# Patient Record
Sex: FEMALE | Race: WHITE | NOT HISPANIC OR LATINO | URBAN - METROPOLITAN AREA
[De-identification: names, ages, dates, MRNs, and addresses within clinical notes are randomized per-mention and may not be internally consistent; named-entity substitution may affect disease eponyms.]

---

## 2018-03-10 ENCOUNTER — INPATIENT (INPATIENT)
Facility: HOSPITAL | Age: 28
LOS: 4 days | Discharge: HOME | DRG: 897 | End: 2018-03-15
Attending: INTERNAL MEDICINE | Admitting: INTERNAL MEDICINE
Payer: MEDICAID

## 2018-03-10 VITALS
DIASTOLIC BLOOD PRESSURE: 75 MMHG | WEIGHT: 100.09 LBS | OXYGEN SATURATION: 100 % | TEMPERATURE: 98 F | HEIGHT: 64 IN | SYSTOLIC BLOOD PRESSURE: 111 MMHG | RESPIRATION RATE: 17 BRPM | HEART RATE: 54 BPM

## 2018-03-10 DIAGNOSIS — F10.239 ALCOHOL DEPENDENCE WITH WITHDRAWAL, UNSPECIFIED: ICD-10-CM

## 2018-03-10 DIAGNOSIS — F50.9 EATING DISORDER, UNSPECIFIED: ICD-10-CM

## 2018-03-10 DIAGNOSIS — F32.9 MAJOR DEPRESSIVE DISORDER, SINGLE EPISODE, UNSPECIFIED: ICD-10-CM

## 2018-03-10 DIAGNOSIS — Z29.9 ENCOUNTER FOR PROPHYLACTIC MEASURES, UNSPECIFIED: ICD-10-CM

## 2018-03-10 DIAGNOSIS — F10.230 ALCOHOL DEPENDENCE WITH WITHDRAWAL, UNCOMPLICATED: ICD-10-CM

## 2018-03-10 LAB
ALBUMIN SERPL ELPH-MCNC: 3 G/DL — LOW (ref 3.5–5)
ALP SERPL-CCNC: 157 U/L — HIGH (ref 40–120)
ALT FLD-CCNC: 46 U/L DA — SIGNIFICANT CHANGE UP (ref 10–60)
ANION GAP SERPL CALC-SCNC: 9 MMOL/L — SIGNIFICANT CHANGE UP (ref 5–17)
AST SERPL-CCNC: 125 U/L — HIGH (ref 10–40)
BASOPHILS # BLD AUTO: 0 K/UL — SIGNIFICANT CHANGE UP (ref 0–0.2)
BASOPHILS NFR BLD AUTO: 0.6 % — SIGNIFICANT CHANGE UP (ref 0–2)
BILIRUB DIRECT SERPL-MCNC: 0.3 MG/DL — HIGH (ref 0–0.2)
BILIRUB INDIRECT FLD-MCNC: 0.5 MG/DL — SIGNIFICANT CHANGE UP (ref 0.2–1)
BILIRUB SERPL-MCNC: 0.8 MG/DL — SIGNIFICANT CHANGE UP (ref 0.2–1.2)
BUN SERPL-MCNC: 5 MG/DL — LOW (ref 7–18)
CALCIUM SERPL-MCNC: 8.7 MG/DL — SIGNIFICANT CHANGE UP (ref 8.4–10.5)
CHLORIDE SERPL-SCNC: 102 MMOL/L — SIGNIFICANT CHANGE UP (ref 96–108)
CO2 SERPL-SCNC: 26 MMOL/L — SIGNIFICANT CHANGE UP (ref 22–31)
CREAT SERPL-MCNC: 0.67 MG/DL — SIGNIFICANT CHANGE UP (ref 0.5–1.3)
EOSINOPHIL # BLD AUTO: 0 K/UL — SIGNIFICANT CHANGE UP (ref 0–0.5)
EOSINOPHIL NFR BLD AUTO: 0.3 % — SIGNIFICANT CHANGE UP (ref 0–6)
ETHANOL SERPL-MCNC: <3 MG/DL — SIGNIFICANT CHANGE UP (ref 0–10)
GLUCOSE SERPL-MCNC: 83 MG/DL — SIGNIFICANT CHANGE UP (ref 70–99)
HCG UR QL: NEGATIVE — SIGNIFICANT CHANGE UP
HCT VFR BLD CALC: 36.4 % — SIGNIFICANT CHANGE UP (ref 34.5–45)
HGB BLD-MCNC: 12.4 G/DL — SIGNIFICANT CHANGE UP (ref 11.5–15.5)
LYMPHOCYTES # BLD AUTO: 0.5 K/UL — LOW (ref 1–3.3)
LYMPHOCYTES # BLD AUTO: 8.5 % — LOW (ref 13–44)
MAGNESIUM SERPL-MCNC: 1.9 MG/DL — SIGNIFICANT CHANGE UP (ref 1.6–2.6)
MCHC RBC-ENTMCNC: 34.1 GM/DL — SIGNIFICANT CHANGE UP (ref 32–36)
MCHC RBC-ENTMCNC: 36.5 PG — HIGH (ref 27–34)
MCV RBC AUTO: 107 FL — HIGH (ref 80–100)
MONOCYTES # BLD AUTO: 0.3 K/UL — SIGNIFICANT CHANGE UP (ref 0–0.9)
MONOCYTES NFR BLD AUTO: 4.7 % — SIGNIFICANT CHANGE UP (ref 2–14)
NEUTROPHILS # BLD AUTO: 4.8 K/UL — SIGNIFICANT CHANGE UP (ref 1.8–7.4)
NEUTROPHILS NFR BLD AUTO: 86 % — HIGH (ref 43–77)
PHOSPHATE SERPL-MCNC: 4.2 MG/DL — SIGNIFICANT CHANGE UP (ref 2.5–4.5)
PLATELET # BLD AUTO: 84 K/UL — LOW (ref 150–400)
POTASSIUM SERPL-MCNC: 3.9 MMOL/L — SIGNIFICANT CHANGE UP (ref 3.5–5.3)
POTASSIUM SERPL-SCNC: 3.9 MMOL/L — SIGNIFICANT CHANGE UP (ref 3.5–5.3)
PROT SERPL-MCNC: 6.6 G/DL — SIGNIFICANT CHANGE UP (ref 6–8.3)
RBC # BLD: 3.4 M/UL — LOW (ref 3.8–5.2)
RBC # FLD: 11.8 % — SIGNIFICANT CHANGE UP (ref 10.3–14.5)
SODIUM SERPL-SCNC: 137 MMOL/L — SIGNIFICANT CHANGE UP (ref 135–145)
WBC # BLD: 5.6 K/UL — SIGNIFICANT CHANGE UP (ref 3.8–10.5)
WBC # FLD AUTO: 5.6 K/UL — SIGNIFICANT CHANGE UP (ref 3.8–10.5)

## 2018-03-10 PROCEDURE — 99285 EMERGENCY DEPT VISIT HI MDM: CPT

## 2018-03-10 PROCEDURE — 70450 CT HEAD/BRAIN W/O DYE: CPT | Mod: 26

## 2018-03-10 PROCEDURE — 93010 ELECTROCARDIOGRAM REPORT: CPT

## 2018-03-10 RX ORDER — FLUOXETINE HCL 10 MG
1 CAPSULE ORAL
Qty: 0 | Refills: 0 | COMMUNITY

## 2018-03-10 RX ORDER — SODIUM CHLORIDE 9 MG/ML
1000 INJECTION, SOLUTION INTRAVENOUS
Qty: 0 | Refills: 0 | Status: COMPLETED | OUTPATIENT
Start: 2018-03-10 | End: 2018-03-10

## 2018-03-10 RX ORDER — THIAMINE MONONITRATE (VIT B1) 100 MG
100 TABLET ORAL DAILY
Qty: 0 | Refills: 0 | Status: COMPLETED | OUTPATIENT
Start: 2018-03-10 | End: 2018-03-13

## 2018-03-10 RX ORDER — FLUOXETINE HCL 10 MG
40 CAPSULE ORAL DAILY
Qty: 0 | Refills: 0 | Status: DISCONTINUED | OUTPATIENT
Start: 2018-03-11 | End: 2018-03-15

## 2018-03-10 RX ORDER — SODIUM CHLORIDE 9 MG/ML
1000 INJECTION INTRAMUSCULAR; INTRAVENOUS; SUBCUTANEOUS
Qty: 0 | Refills: 0 | Status: DISCONTINUED | OUTPATIENT
Start: 2018-03-10 | End: 2018-03-13

## 2018-03-10 RX ORDER — SODIUM CHLORIDE 9 MG/ML
1000 INJECTION, SOLUTION INTRAVENOUS
Qty: 0 | Refills: 0 | Status: DISCONTINUED | OUTPATIENT
Start: 2018-03-11 | End: 2018-03-15

## 2018-03-10 RX ADMIN — Medication 2 MILLIGRAM(S): at 18:48

## 2018-03-10 RX ADMIN — SODIUM CHLORIDE 100 MILLILITER(S): 9 INJECTION INTRAMUSCULAR; INTRAVENOUS; SUBCUTANEOUS at 22:47

## 2018-03-10 RX ADMIN — Medication 1 MILLIGRAM(S): at 11:22

## 2018-03-10 RX ADMIN — Medication 2 MILLIGRAM(S): at 23:00

## 2018-03-10 RX ADMIN — SODIUM CHLORIDE 150 MILLILITER(S): 9 INJECTION, SOLUTION INTRAVENOUS at 12:48

## 2018-03-10 NOTE — ED PROVIDER NOTE - CARE PLAN
Principal Discharge DX:	Alcohol withdrawal syndrome with complication  Secondary Diagnosis:	Seizure  Secondary Diagnosis:	Eating disorder

## 2018-03-10 NOTE — H&P ADULT - NSHPSOCIALHISTORY_GEN_ALL_CORE
Hevayt alcohol user, usually drinks half a bottle of Vodka everyday, last drink 2 days back on Thursday when she drank one full bottle of vodka.  Never smoker and denies using illicit drugs.

## 2018-03-10 NOTE — H&P ADULT - PROBLEM SELECTOR PLAN 2
-supportive care and c/w home meds. Patient is on Prozac 40 mg and one other medication which she takes 3 times a day but does not remember the name, primary team requested to confirm with pharmacy.

## 2018-03-10 NOTE — H&P ADULT - HISTORY OF PRESENT ILLNESS
27 years old female with PMH of ETOH abuse (completed Detox program, follows with councellor), depression and eating disorder (combination of anorexia nervosa and bulimia) brought to ED c/o seizure x today. Patient was at airport today to fly to [place] after recently completing the Detox program, when she had an episode of witnessed seizure at the airport. Per mother, patient was noticed to have generalized shaking of her whole body with eye rolling and foaming at mouth. Denied any urinary or fecal incontinence, head injury or LOC. Mother said she held her and laid her on ground without letting her injure herself. No prior history of seizure. Currently denies any headache, dizziness, chest pain, palpitations, sob, abdominal pain, nausea, vomiting, diarrhea or urinary complains.     Social history: Currently unemployed, completed college. Denies smoking or illicit drug use. Usually drinks half a bottle of Vodka everyday , but had full bottle on Thursday which was last drink. LMP 1 year back, periods has been irregular for few years due to eating disorder and depression, previously were regular. Not sexually active currently.

## 2018-03-10 NOTE — H&P ADULT - ATTENDING COMMENTS
Patient seen and examined. Patient's history, vitals, labs, imaging studies reviewed. Discussed with above resident, agree with note with edits. Plan of care discussed with patient, and her mother at bedside, and agrees, all questions answered.   Kimber Zapata MD

## 2018-03-10 NOTE — PATIENT PROFILE ADULT. - VISION (WITH CORRECTIVE LENSES IF THE PATIENT USUALLY WEARS THEM):
Normal vision: sees adequately in most situations; can see medication labels, newsprint/pt wearing reading glasses

## 2018-03-10 NOTE — H&P ADULT - PROBLEM SELECTOR PLAN 1
-No prior history of seizure  -Heavy etoh use.  -Blood alcohol level is < 3  -Mild tremors noted on exam  -CT head negative for any acute events.   -Started on CIWA protocol, banana bag with multivitamins, thiamine and folic acid  -IM thiamine x 3 days  -Ativan q4 standing and q2 prn for CIWA > 7 / breakthrough seizure   -Monitor and replace electrolytes routinely.

## 2018-03-10 NOTE — ED PROVIDER NOTE - OBJECTIVE STATEMENT
26 y/o F pt w/ PMHx of bulimia presents to the ED c/o seizures. Pt has never had a seizure before. Pt states that she completed a detox program and was on her way to [place] when she had a seizure. Denies fever, chills, incontinence or any other complaints. NKDA.

## 2018-03-10 NOTE — H&P ADULT - ASSESSMENT
27 years old female with PMH of ETOH abuse (completed Detox program), depression and eating disorder (combination of anorexia nervosa and bulimia) brought to ED c/o seizure x today. Admitted to medicine for further management.

## 2018-03-10 NOTE — H&P ADULT - PMH
Depression    Eating disorder  combination of anorexia and bulimia nervosa  ETOH abuse    Pancreatitis

## 2018-03-10 NOTE — ED ADULT NURSE NOTE - CHPI ED SYMPTOMS NEG
no nausea/no fever/no blurred vision/no weakness/no vomiting/no confusion/no dizziness/no change in level of consciousness/no numbness

## 2018-03-10 NOTE — ED ADULT NURSE NOTE - OBJECTIVE STATEMENT
Patient presents to ED with seizure, patient was at airport on her way home with mother to [place]. At present no seizure activity patient c/o headache on pain scale 4/10.

## 2018-03-10 NOTE — H&P ADULT - PROBLEM SELECTOR PLAN 4
IMPROVE VTE Individual Risk Assessment          RISK                                                          Points  [  ] Previous VTE                                                3  [  ] Thrombophilia                                             2  [  ] Lower limb paralysis                                   2        (unable to hold up >15 seconds)    [  ] Current Cancer                                             2         (within 6 months)  [ x ] Immobilization > 24 hrs                              1  [  ] ICU/CCU stay > 24 hours                             1  [  ] Age > 60                                                         1    IMPROVE VTE Score: 1  No indication for DVT ppx

## 2018-03-11 LAB
24R-OH-CALCIDIOL SERPL-MCNC: 8.5 NG/ML — LOW (ref 30–80)
ANION GAP SERPL CALC-SCNC: 8 MMOL/L — SIGNIFICANT CHANGE UP (ref 5–17)
BUN SERPL-MCNC: 5 MG/DL — LOW (ref 7–18)
CALCIUM SERPL-MCNC: 8 MG/DL — LOW (ref 8.4–10.5)
CHLORIDE SERPL-SCNC: 107 MMOL/L — SIGNIFICANT CHANGE UP (ref 96–108)
CO2 SERPL-SCNC: 24 MMOL/L — SIGNIFICANT CHANGE UP (ref 22–31)
CREAT SERPL-MCNC: 0.48 MG/DL — LOW (ref 0.5–1.3)
FOLATE SERPL-MCNC: >20 NG/ML — SIGNIFICANT CHANGE UP (ref 4.8–24.2)
GLUCOSE SERPL-MCNC: 62 MG/DL — LOW (ref 70–99)
HBA1C BLD-MCNC: 4.4 % — SIGNIFICANT CHANGE UP (ref 4–5.6)
MAGNESIUM SERPL-MCNC: 2.1 MG/DL — SIGNIFICANT CHANGE UP (ref 1.6–2.6)
PHOSPHATE SERPL-MCNC: 4.7 MG/DL — HIGH (ref 2.5–4.5)
POTASSIUM SERPL-MCNC: 3.4 MMOL/L — LOW (ref 3.5–5.3)
POTASSIUM SERPL-SCNC: 3.4 MMOL/L — LOW (ref 3.5–5.3)
SODIUM SERPL-SCNC: 139 MMOL/L — SIGNIFICANT CHANGE UP (ref 135–145)
VIT B12 SERPL-MCNC: 982 PG/ML — SIGNIFICANT CHANGE UP (ref 232–1245)

## 2018-03-11 RX ORDER — INFLUENZA VIRUS VACCINE 15; 15; 15; 15 UG/.5ML; UG/.5ML; UG/.5ML; UG/.5ML
0.5 SUSPENSION INTRAMUSCULAR ONCE
Qty: 0 | Refills: 0 | Status: COMPLETED | OUTPATIENT
Start: 2018-03-11 | End: 2018-03-11

## 2018-03-11 RX ADMIN — Medication 2 MILLIGRAM(S): at 10:06

## 2018-03-11 RX ADMIN — SODIUM CHLORIDE 100 MILLILITER(S): 9 INJECTION INTRAMUSCULAR; INTRAVENOUS; SUBCUTANEOUS at 11:47

## 2018-03-11 RX ADMIN — Medication 100 MILLIGRAM(S): at 11:46

## 2018-03-11 RX ADMIN — SODIUM CHLORIDE 100 MILLILITER(S): 9 INJECTION, SOLUTION INTRAVENOUS at 00:01

## 2018-03-11 RX ADMIN — Medication 1 MILLIGRAM(S): at 13:42

## 2018-03-11 RX ADMIN — Medication 2 MILLIGRAM(S): at 06:34

## 2018-03-11 RX ADMIN — Medication 1 MILLIGRAM(S): at 22:53

## 2018-03-11 RX ADMIN — INFLUENZA VIRUS VACCINE 0.5 MILLILITER(S): 15; 15; 15; 15 SUSPENSION INTRAMUSCULAR at 06:35

## 2018-03-11 RX ADMIN — Medication 40 MILLIGRAM(S): at 11:47

## 2018-03-11 RX ADMIN — SODIUM CHLORIDE 100 MILLILITER(S): 9 INJECTION, SOLUTION INTRAVENOUS at 22:57

## 2018-03-11 NOTE — PROGRESS NOTE ADULT - SUBJECTIVE AND OBJECTIVE BOX
Patient is a 27y old  Female who presents with a chief complaint of alcohol withdrawal seizure (10 Mar 2018 17:56)    Patient reports she feels better today denies any complaints.     MEDICATIONS  (STANDING):  FLUoxetine 40 milliGRAM(s) Oral daily  LORazepam   Injectable 1 milliGRAM(s) IV Push every 8 hours  sodium chloride 0.9% 1000 milliLiter(s) (100 mL/Hr) IV Continuous <Continuous>  sodium chloride 0.9%. 1000 milliLiter(s) (100 mL/Hr) IV Continuous <Continuous>  thiamine Injectable 100 milliGRAM(s) IntraMuscular daily    MEDICATIONS  (PRN):  LORazepam   Injectable 2 milliGRAM(s) IV Push every 2 hours PRN CIWA > 7 / Breakthrough seizure        REVIEW OF SYSTEMS:  CONSTITUTIONAL: No fever, weight loss, or fatigue  EYES: No eye pain, visual disturbances, or discharge  ENMT:  No difficulty hearing, tinnitus, vertigo; No sinus or throat pain  NECK: No pain or stiffness  RESPIRATORY: No cough, wheezing, chills or hemoptysis; No shortness of breath  CARDIOVASCULAR: No chest pain, palpitations, dizziness, or leg swelling  GASTROINTESTINAL: No abdominal or epigastric pain. No nausea, vomiting, or hematemesis; No diarrhea or constipation. No melena or hematochezia.  GENITOURINARY: No dysuria, frequency, hematuria, or incontinence  NEUROLOGICAL: No headaches, memory loss, loss of strength, numbness, has mild tremors  SKIN: No itching, burning, rashes, or lesions   LYMPH NODES: No enlarged glands  ENDOCRINE: No heat or cold intolerance; No hair loss  MUSCULOSKELETAL: No joint pain or swelling; No muscle, back, or extremity pain  PSYCHIATRIC: No depression, anxiety, mood swings, or difficulty sleeping  HEME/LYMPH: No easy bruising, or bleeding gums  ALLERY AND IMMUNOLOGIC: No hives or eczema           PHYSICAL EXAM:    T(C): 37.1 (03-11-18 @ 21:05), Max: 37.4 (03-11-18 @ 14:13)  HR: 85 (03-11-18 @ 21:05) (58 - 85)  BP: 117/79 (03-11-18 @ 21:05) (95/65 - 117/79)  RR: 16 (03-11-18 @ 21:05) (16 - 19)  SpO2: 99% (03-11-18 @ 21:05) (99% - 100%)        GENERAL: NAD, well-groomed, well-developed  HEAD:  Atraumatic, Normocephalic  EYES: EOMI, PERRL, conjunctiva and sclera clear  ENMT: No tonsillar erythema, exudates, or enlargement; Moist mucous membranes, Good dentition, No lesions  NECK: Supple, No JVD, Normal thyroid  NERVOUS SYSTEM:  Alert & Oriented X3, Good concentration; mild bilateral hand tremors  CHEST/LUNG: Clear to percussion bilaterally; No rales, rhonchi, wheezing, or rubs  HEART: Regular rate and rhythm; No murmurs, rubs, or gallops  ABDOMEN: Soft, Nontender, Nondistended; Bowel sounds present  EXTREMITIES:  2+ Peripheral Pulses, No clubbing, cyanosis, or edema  LYMPH: No lymphadenopathy noted  SKIN: No rashes or lesions    LABS:                        12.4   5.6   )-----------( 84       ( 10 Mar 2018 17:36 )             36.4     03-11    139  |  107  |  5<L>  ----------------------------<  62<L>  3.4<L>   |  24  |  0.48<L>    Ca    8.0<L>      11 Mar 2018 07:22  Phos  4.7     03-11  Mg     2.1     03-11    TPro  6.6  /  Alb  3.0<L>  /  TBili  0.8  /  DBili  0.3<H>  /  AST  125<H>  /  ALT  46  /  AlkPhos  157<H>  03-10            RADIOLOGY & ADDITIONAL TESTS:    Imaging Personally Reviewed:  [x] YES  [ ] NO    Consultant(s) Notes Reviewed:  [x] YES  [ ] NO    Care Discussed with Consultants/Other Providers [x] YES  [ ] NO

## 2018-03-11 NOTE — PROGRESS NOTE ADULT - PROBLEM SELECTOR PLAN 1
-No prior history of seizure  -Heavy etoh use.  -Blood alcohol level is < 3  -Mild tremors noted on exam  -CT head negative for any acute events.   -Started on CIWA protocol, banana bag with multivitamins, thiamine and folic acid  -IM thiamine x 3 days  -Ativan protocol/ CIWA > 7 / breakthrough seizure   -Monitor and replace electrolytes routinely.

## 2018-03-11 NOTE — PROGRESS NOTE ADULT - ASSESSMENT
27 years old female with PMH of ETOH abuse (completed Detox program), depression and eating disorder (combination of anorexia nervosa and bulimia) brought to ED c/o seizure x 1. Admitted to medicine for further management.

## 2018-03-12 DIAGNOSIS — D69.6 THROMBOCYTOPENIA, UNSPECIFIED: ICD-10-CM

## 2018-03-12 LAB
ALBUMIN SERPL ELPH-MCNC: 2.6 G/DL — LOW (ref 3.5–5)
ALP SERPL-CCNC: 133 U/L — HIGH (ref 40–120)
ALT FLD-CCNC: 35 U/L DA — SIGNIFICANT CHANGE UP (ref 10–60)
ANION GAP SERPL CALC-SCNC: 10 MMOL/L — SIGNIFICANT CHANGE UP (ref 5–17)
AST SERPL-CCNC: 89 U/L — HIGH (ref 10–40)
BILIRUB SERPL-MCNC: 0.9 MG/DL — SIGNIFICANT CHANGE UP (ref 0.2–1.2)
BUN SERPL-MCNC: 5 MG/DL — LOW (ref 7–18)
CALCIUM SERPL-MCNC: 8.2 MG/DL — LOW (ref 8.4–10.5)
CHLORIDE SERPL-SCNC: 108 MMOL/L — SIGNIFICANT CHANGE UP (ref 96–108)
CO2 SERPL-SCNC: 21 MMOL/L — LOW (ref 22–31)
CREAT SERPL-MCNC: 0.39 MG/DL — LOW (ref 0.5–1.3)
EOSINOPHIL NFR BLD AUTO: 2 % — SIGNIFICANT CHANGE UP (ref 0–6)
GLUCOSE SERPL-MCNC: 62 MG/DL — LOW (ref 70–99)
HCT VFR BLD CALC: 36.2 % — SIGNIFICANT CHANGE UP (ref 34.5–45)
HGB BLD-MCNC: 12.3 G/DL — SIGNIFICANT CHANGE UP (ref 11.5–15.5)
LYMPHOCYTES # BLD AUTO: 23 % — SIGNIFICANT CHANGE UP (ref 13–44)
MAGNESIUM SERPL-MCNC: 2.2 MG/DL — SIGNIFICANT CHANGE UP (ref 1.6–2.6)
MCHC RBC-ENTMCNC: 33.9 GM/DL — SIGNIFICANT CHANGE UP (ref 32–36)
MCHC RBC-ENTMCNC: 35.9 PG — HIGH (ref 27–34)
MCV RBC AUTO: 105.8 FL — HIGH (ref 80–100)
MONOCYTES NFR BLD AUTO: 4 % — SIGNIFICANT CHANGE UP (ref 2–14)
NEUTROPHILS NFR BLD AUTO: 70 % — SIGNIFICANT CHANGE UP (ref 43–77)
PHOSPHATE SERPL-MCNC: 3.9 MG/DL — SIGNIFICANT CHANGE UP (ref 2.5–4.5)
PLATELET # BLD AUTO: 67 K/UL — LOW (ref 150–400)
POTASSIUM SERPL-MCNC: 3.6 MMOL/L — SIGNIFICANT CHANGE UP (ref 3.5–5.3)
POTASSIUM SERPL-SCNC: 3.6 MMOL/L — SIGNIFICANT CHANGE UP (ref 3.5–5.3)
PROT SERPL-MCNC: 5.8 G/DL — LOW (ref 6–8.3)
RBC # BLD: 3.42 M/UL — LOW (ref 3.8–5.2)
RBC # FLD: 11.4 % — SIGNIFICANT CHANGE UP (ref 10.3–14.5)
SODIUM SERPL-SCNC: 139 MMOL/L — SIGNIFICANT CHANGE UP (ref 135–145)
WBC # BLD: 2.4 K/UL — LOW (ref 3.8–10.5)
WBC # FLD AUTO: 2.4 K/UL — LOW (ref 3.8–10.5)

## 2018-03-12 RX ADMIN — Medication 0.25 MILLIGRAM(S): at 22:27

## 2018-03-12 RX ADMIN — Medication 40 MILLIGRAM(S): at 11:11

## 2018-03-12 RX ADMIN — Medication 100 MILLIGRAM(S): at 11:10

## 2018-03-12 RX ADMIN — Medication 0.5 MILLIGRAM(S): at 18:22

## 2018-03-12 RX ADMIN — Medication 1 MILLIGRAM(S): at 07:11

## 2018-03-12 NOTE — PROGRESS NOTE ADULT - ASSESSMENT
27 years old female with PMH of ETOH abuse (completed Detox program), depression and eating disorder (combination of anorexia nervosa and bulimia) brought to ED c/o seizure . Admitted to medicine for alcohol withdrawal seizures

## 2018-03-12 NOTE — PROGRESS NOTE ADULT - SUBJECTIVE AND OBJECTIVE BOX
Patient is a 27y old  Female who presents with a chief complaint of alcohol withdrawal seizure x today (10 Mar 2018 17:56)      INTERVAL HPI/OVERNIGHT EVENTS:    MEDICATIONS  (STANDING):  FLUoxetine 40 milliGRAM(s) Oral daily  LORazepam   Injectable 0.5 milliGRAM(s) IV Push every 12 hours  sodium chloride 0.9% 1000 milliLiter(s) (100 mL/Hr) IV Continuous <Continuous>  sodium chloride 0.9%. 1000 milliLiter(s) (100 mL/Hr) IV Continuous <Continuous>  thiamine Injectable 100 milliGRAM(s) IntraMuscular daily    MEDICATIONS  (PRN):  LORazepam   Injectable 2 milliGRAM(s) IV Push every 2 hours PRN CIWA > 7 / Breakthrough seizure      Allergies    No Known Allergies    Intolerances        REVIEW OF SYSTEMS:  CONSTITUTIONAL: No fever, weight loss, or fatigue  EYES: No eye pain, visual disturbances, or discharge  ENMT:  No difficulty hearing, tinnitus, vertigo; No sinus or throat pain  NECK: No pain or stiffness  BREASTS: No pain, masses, or nipple discharge  RESPIRATORY: No cough, wheezing, chills or hemoptysis; No shortness of breath  CARDIOVASCULAR: No chest pain, palpitations, dizziness, or leg swelling  GASTROINTESTINAL: No abdominal or epigastric pain. No nausea, vomiting, or hematemesis; No diarrhea or constipation. No melena or hematochezia.  GENITOURINARY: No dysuria, frequency, hematuria, or incontinence  NEUROLOGICAL: No headaches, memory loss, loss of strength, numbness, or tremors  SKIN: No itching, burning, rashes, or lesions   LYMPH NODES: No enlarged glands  ENDOCRINE: No heat or cold intolerance; No hair loss  MUSCULOSKELETAL: No joint pain or swelling; No muscle, back, or extremity pain  PSYCHIATRIC: No depression, anxiety, mood swings, or difficulty sleeping  HEME/LYMPH: No easy bruising, or bleeding gums  ALLERGY AND IMMUNOLOGIC: No hives or eczema    Vital Signs Last 24 Hrs  T(C): 36.9 (12 Mar 2018 20:14), Max: 36.9 (12 Mar 2018 20:14)  T(F): 98.5 (12 Mar 2018 20:14), Max: 98.5 (12 Mar 2018 20:14)  HR: 78 (12 Mar 2018 20:14) (78 - 82)  BP: 102/71 (12 Mar 2018 20:14) (100/68 - 103/74)  BP(mean): --  RR: 16 (12 Mar 2018 20:14) (16 - 17)  SpO2: 99% (12 Mar 2018 20:14) (98% - 100%)    PHYSICAL EXAM:  GENERAL: NAD, well-groomed, well-developed  HEAD:  Atraumatic, Normocephalic  EYES: EOMI, PERRLA, conjunctiva and sclera clear  ENMT: No tonsillar erythema, exudates, or enlargement; Moist mucous membranes, Good dentition, No lesions  NECK: Supple, No JVD, Normal thyroid  NERVOUS SYSTEM:  Alert & Oriented X3, Good concentration; Motor Strength 5/5 B/L upper and lower extremities; DTRs 2+ intact and symmetric  CHEST/LUNG: Clear to percussion bilaterally; No rales, rhonchi, wheezing, or rubs  HEART: Regular rate and rhythm; No murmurs, rubs, or gallops  ABDOMEN: Soft, Nontender, Nondistended; Bowel sounds present  EXTREMITIES:  2+ Peripheral Pulses, No clubbing, cyanosis, or edema  LYMPH: No lymphadenopathy noted  SKIN: No rashes or lesions    LABS:                        12.3   2.4   )-----------( 67       ( 12 Mar 2018 06:34 )             36.2     03-12    139  |  108  |  5<L>  ----------------------------<  62<L>  3.6   |  21<L>  |  0.39<L>    Ca    8.2<L>      12 Mar 2018 06:34  Phos  3.9     03-12  Mg     2.2     03-12    TPro  5.8<L>  /  Alb  2.6<L>  /  TBili  0.9  /  DBili  x   /  AST  89<H>  /  ALT  35  /  AlkPhos  133<H>  03-12        CAPILLARY BLOOD GLUCOSE          RADIOLOGY & ADDITIONAL TESTS:    Imaging Personally Reviewed:  [ ] YES  [ ] NO    Consultant(s) Notes Reviewed:  [ ] YES  [ ] NO    Care Discussed with Consultants/Other Providers [ ] YES  [ ] NO Patient is a 27y old  Female who presents with a chief complaint of alcohol withdrawal seizure  (10 Mar 2018 17:56)      INTERVAL HPI/OVERNIGHT EVENTS: none, doing well    MEDICATIONS  (STANDING):  FLUoxetine 40 milliGRAM(s) Oral daily  LORazepam   Injectable 0.5 milliGRAM(s) IV Push every 12 hours  sodium chloride 0.9% 1000 milliLiter(s) (100 mL/Hr) IV Continuous <Continuous>  sodium chloride 0.9%. 1000 milliLiter(s) (100 mL/Hr) IV Continuous <Continuous>  thiamine Injectable 100 milliGRAM(s) IntraMuscular daily    MEDICATIONS  (PRN):  LORazepam   Injectable 2 milliGRAM(s) IV Push every 2 hours PRN CIWA > 7 / Breakthrough seizure      Allergies    No Known Allergies          REVIEW OF SYSTEMS:  CONSTITUTIONAL: No fever, weight loss, or fatigue  EYES: No eye pain, visual disturbances, or discharge  ENMT:  No difficulty hearing, tinnitus, vertigo; No sinus or throat pain  NECK: No pain or stiffness  BREASTS: No pain, masses, or nipple discharge  RESPIRATORY: No cough, wheezing, chills or hemoptysis; No shortness of breath  CARDIOVASCULAR: No chest pain, palpitations, dizziness, or leg swelling  GASTROINTESTINAL: No abdominal or epigastric pain. No nausea, vomiting, or hematemesis; No diarrhea or constipation. No melena or hematochezia.  GENITOURINARY: No dysuria, frequency, hematuria, or incontinence  NEUROLOGICAL: No headaches, memory loss, loss of strength, numbness, or tremors  SKIN: No itching, burning, rashes, or lesions   LYMPH NODES: No enlarged glands  ENDOCRINE: No heat or cold intolerance; No hair loss  MUSCULOSKELETAL: No joint pain or swelling; No muscle, back, or extremity pain  PSYCHIATRIC: No depression, anxiety, mood swings, or difficulty sleeping  HEME/LYMPH: No easy bruising, or bleeding gums  ALLERGY AND IMMUNOLOGIC: No hives or eczema    Vital Signs Last 24 Hrs  T(C): 36.9 (12 Mar 2018 20:14), Max: 36.9 (12 Mar 2018 20:14)  T(F): 98.5 (12 Mar 2018 20:14), Max: 98.5 (12 Mar 2018 20:14)  HR: 78 (12 Mar 2018 20:14) (78 - 82)  BP: 102/71 (12 Mar 2018 20:14) (100/68 - 103/74)  RR: 16 (12 Mar 2018 20:14) (16 - 17)  SpO2: 99% (12 Mar 2018 20:14) (98% - 100%)    PHYSICAL EXAM:  GENERAL: NAD, well-groomed, well-developed  HEAD:  Atraumatic, Normocephalic  EYES: EOMI, PERRL, conjunctiva and sclera clear  ENMT: No tonsillar erythema, exudates, or enlargement; Moist mucous membranes, Good dentition, No lesions  NECK: Supple, No JVD, Normal thyroid  NERVOUS SYSTEM:  Alert & Oriented X3, Good concentration; Motor Strength 5/5 B/L upper and lower extremities; DTRs 2+ intact and symmetric, no tremors  CHEST/LUNG: Clear to percussion bilaterally; No rales, rhonchi, wheezing, or rubs  HEART: Regular rate and rhythm; No murmurs, rubs, or gallops  ABDOMEN: Soft, Nontender, Nondistended; Bowel sounds present  EXTREMITIES:  2+ Peripheral Pulses, No clubbing, cyanosis, or edema  LYMPH: No lymphadenopathy noted  SKIN: No rashes or lesions    LABS:                        12.3   2.4   )-----------( 67       ( 12 Mar 2018 06:34 )             36.2     03-12    139  |  108  |  5<L>  ----------------------------<  62<L>  3.6   |  21<L>  |  0.39<L>    Ca    8.2<L>      12 Mar 2018 06:34  Phos  3.9     03-12  Mg     2.2     03-12    TPro  5.8<L>  /  Alb  2.6<L>  /  TBili  0.9  /  DBili  x   /  AST  89<H>  /  ALT  35  /  AlkPhos  133<H>  03-12        CAPILLARY BLOOD GLUCOSE          RADIOLOGY & ADDITIONAL TESTS:    Imaging Personally Reviewed:  [x] YES  [ ] NO    Consultant(s) Notes Reviewed:  [x] YES  [ ] NO    Care Discussed with Consultants/Other Providers [x] YES  [ ] NO

## 2018-03-12 NOTE — PROGRESS NOTE ADULT - PROBLEM SELECTOR PLAN 1
-No prior history of seizure  - Has been Stable for discharge today after completing treatment for alcohol withdrawal . However wants to leave [place]  - No prior history of seizure  - Ciwa of 0 at time of my exam  - Will go to rehab after discharge  - CT head on admission negative for any acute events.  - Completed banana bag with multivitamins, thiamine and folic acid

## 2018-03-12 NOTE — PROGRESS NOTE ADULT - PROBLEM SELECTOR PLAN 3
- Thrombocytopenia and luekopenia Likely from bone marrow suppression secondary to heavy alcohol use  - Will get repeat labs in am  - No signs of active bleeding,fevers or infection - Thrombocytopenia and leukopenia Likely from bone marrow suppression secondary to heavy alcohol use  - Will get repeat labs in am  - No signs of active bleeding,fevers or infection

## 2018-03-13 LAB
ALBUMIN SERPL ELPH-MCNC: 2.8 G/DL — LOW (ref 3.5–5)
ALP SERPL-CCNC: 126 U/L — HIGH (ref 40–120)
ALT FLD-CCNC: 35 U/L DA — SIGNIFICANT CHANGE UP (ref 10–60)
ANION GAP SERPL CALC-SCNC: 9 MMOL/L — SIGNIFICANT CHANGE UP (ref 5–17)
AST SERPL-CCNC: 75 U/L — HIGH (ref 10–40)
BASOPHILS # BLD AUTO: 0 K/UL — SIGNIFICANT CHANGE UP (ref 0–0.2)
BASOPHILS NFR BLD AUTO: 1.8 % — SIGNIFICANT CHANGE UP (ref 0–2)
BILIRUB SERPL-MCNC: 0.9 MG/DL — SIGNIFICANT CHANGE UP (ref 0.2–1.2)
BUN SERPL-MCNC: 7 MG/DL — SIGNIFICANT CHANGE UP (ref 7–18)
CALCIUM SERPL-MCNC: 8.5 MG/DL — SIGNIFICANT CHANGE UP (ref 8.4–10.5)
CHLORIDE SERPL-SCNC: 105 MMOL/L — SIGNIFICANT CHANGE UP (ref 96–108)
CO2 SERPL-SCNC: 23 MMOL/L — SIGNIFICANT CHANGE UP (ref 22–31)
CREAT SERPL-MCNC: 0.46 MG/DL — LOW (ref 0.5–1.3)
EOSINOPHIL # BLD AUTO: 0.1 K/UL — SIGNIFICANT CHANGE UP (ref 0–0.5)
EOSINOPHIL NFR BLD AUTO: 4.2 % — SIGNIFICANT CHANGE UP (ref 0–6)
GLUCOSE SERPL-MCNC: 65 MG/DL — LOW (ref 70–99)
HCT VFR BLD CALC: 39.3 % — SIGNIFICANT CHANGE UP (ref 34.5–45)
HGB BLD-MCNC: 12.8 G/DL — SIGNIFICANT CHANGE UP (ref 11.5–15.5)
LYMPHOCYTES # BLD AUTO: 0.6 K/UL — LOW (ref 1–3.3)
LYMPHOCYTES # BLD AUTO: 24.4 % — SIGNIFICANT CHANGE UP (ref 13–44)
MAGNESIUM SERPL-MCNC: 2.4 MG/DL — SIGNIFICANT CHANGE UP (ref 1.6–2.6)
MCHC RBC-ENTMCNC: 32.7 GM/DL — SIGNIFICANT CHANGE UP (ref 32–36)
MCHC RBC-ENTMCNC: 34.9 PG — HIGH (ref 27–34)
MCV RBC AUTO: 106.9 FL — HIGH (ref 80–100)
MONOCYTES # BLD AUTO: 0.3 K/UL — SIGNIFICANT CHANGE UP (ref 0–0.9)
MONOCYTES NFR BLD AUTO: 13.1 % — SIGNIFICANT CHANGE UP (ref 2–14)
NEUTROPHILS # BLD AUTO: 1.4 K/UL — LOW (ref 1.8–7.4)
NEUTROPHILS NFR BLD AUTO: 56.3 % — SIGNIFICANT CHANGE UP (ref 43–77)
PCP SPEC-MCNC: SIGNIFICANT CHANGE UP
PHOSPHATE SERPL-MCNC: 4 MG/DL — SIGNIFICANT CHANGE UP (ref 2.5–4.5)
PLATELET # BLD AUTO: 66 K/UL — LOW (ref 150–400)
POTASSIUM SERPL-MCNC: 3.6 MMOL/L — SIGNIFICANT CHANGE UP (ref 3.5–5.3)
POTASSIUM SERPL-SCNC: 3.6 MMOL/L — SIGNIFICANT CHANGE UP (ref 3.5–5.3)
PROT SERPL-MCNC: 6.3 G/DL — SIGNIFICANT CHANGE UP (ref 6–8.3)
RBC # BLD: 3.67 M/UL — LOW (ref 3.8–5.2)
RBC # FLD: 11.5 % — SIGNIFICANT CHANGE UP (ref 10.3–14.5)
SODIUM SERPL-SCNC: 137 MMOL/L — SIGNIFICANT CHANGE UP (ref 135–145)
WBC # BLD: 2.4 K/UL — LOW (ref 3.8–10.5)
WBC # FLD AUTO: 2.4 K/UL — LOW (ref 3.8–10.5)

## 2018-03-13 RX ORDER — ERGOCALCIFEROL 1.25 MG/1
50000 CAPSULE ORAL
Qty: 0 | Refills: 0 | Status: DISCONTINUED | OUTPATIENT
Start: 2018-03-13 | End: 2018-03-15

## 2018-03-13 RX ORDER — SODIUM CHLORIDE 9 MG/ML
1000 INJECTION, SOLUTION INTRAVENOUS
Qty: 0 | Refills: 0 | Status: DISCONTINUED | OUTPATIENT
Start: 2018-03-13 | End: 2018-03-15

## 2018-03-13 RX ADMIN — SODIUM CHLORIDE 100 MILLILITER(S): 9 INJECTION, SOLUTION INTRAVENOUS at 10:00

## 2018-03-13 RX ADMIN — Medication 100 MILLIGRAM(S): at 15:09

## 2018-03-13 RX ADMIN — Medication 40 MILLIGRAM(S): at 15:09

## 2018-03-13 RX ADMIN — Medication 2 MILLIGRAM(S): at 08:44

## 2018-03-13 NOTE — PROGRESS NOTE ADULT - PROBLEM SELECTOR PLAN 3
- Thrombocytopenia and leukopenia Likely from bone marrow suppression secondary to heavy alcohol use  - Will get repeat labs in am  - No signs of active bleeding,fevers or infection

## 2018-03-13 NOTE — PROGRESS NOTE ADULT - PROBLEM SELECTOR PLAN 1
-No prior history of seizure  - Now only on prn Ativan. She has tremors in hands but other wise looks Stable. Could be malingering  - Leaves floor without informing anyone all the time. Nursing informed to be vigilant as concern for drug abuse . Will get urine toxicology  - Continue [facility] protocol  - Will go to rehab after discharge  - CT head on admission negative for any acute events.  - Completed banana bag with multivitamins, thiamine and folic acid

## 2018-03-13 NOTE — PROGRESS NOTE ADULT - PROBLEM SELECTOR PLAN 4
IMPROVE VTE Individual Risk Assessment          RISK                                                          Points  [  ] Previous VTE                                                3  [  ] Thrombophilia                                             2  [  ] Lower limb paralysis                                   2        (unable to hold up >15 seconds)    [  ] Current Cancer                                             2         (within 6 months)  [ x ] Immobilization > 24 hrs                              1  [  ] ICU/CCU stay > 24 hours                             1  [  ] Age > 60                                                         1    IMPROVE VTE Score: 1  No indication for DVT ppx Vitamin D supplement started

## 2018-03-14 DIAGNOSIS — E16.2 HYPOGLYCEMIA, UNSPECIFIED: ICD-10-CM

## 2018-03-14 LAB
ALBUMIN SERPL ELPH-MCNC: 2.7 G/DL — LOW (ref 3.5–5)
ALP SERPL-CCNC: 113 U/L — SIGNIFICANT CHANGE UP (ref 40–120)
ALT FLD-CCNC: 36 U/L DA — SIGNIFICANT CHANGE UP (ref 10–60)
ANION GAP SERPL CALC-SCNC: 7 MMOL/L — SIGNIFICANT CHANGE UP (ref 5–17)
AST SERPL-CCNC: 66 U/L — HIGH (ref 10–40)
BASOPHILS # BLD AUTO: 0.1 K/UL — SIGNIFICANT CHANGE UP (ref 0–0.2)
BASOPHILS NFR BLD AUTO: 2.4 % — HIGH (ref 0–2)
BILIRUB SERPL-MCNC: 0.6 MG/DL — SIGNIFICANT CHANGE UP (ref 0.2–1.2)
BUN SERPL-MCNC: 5 MG/DL — LOW (ref 7–18)
CALCIUM SERPL-MCNC: 8.6 MG/DL — SIGNIFICANT CHANGE UP (ref 8.4–10.5)
CHLORIDE SERPL-SCNC: 107 MMOL/L — SIGNIFICANT CHANGE UP (ref 96–108)
CO2 SERPL-SCNC: 24 MMOL/L — SIGNIFICANT CHANGE UP (ref 22–31)
CREAT SERPL-MCNC: 0.44 MG/DL — LOW (ref 0.5–1.3)
EOSINOPHIL # BLD AUTO: 0.1 K/UL — SIGNIFICANT CHANGE UP (ref 0–0.5)
EOSINOPHIL NFR BLD AUTO: 4.2 % — SIGNIFICANT CHANGE UP (ref 0–6)
GLUCOSE SERPL-MCNC: 68 MG/DL — LOW (ref 70–99)
HCT VFR BLD CALC: 39.2 % — SIGNIFICANT CHANGE UP (ref 34.5–45)
HGB BLD-MCNC: 12.9 G/DL — SIGNIFICANT CHANGE UP (ref 11.5–15.5)
LYMPHOCYTES # BLD AUTO: 0.9 K/UL — LOW (ref 1–3.3)
LYMPHOCYTES # BLD AUTO: 37.2 % — SIGNIFICANT CHANGE UP (ref 13–44)
MAGNESIUM SERPL-MCNC: 2.2 MG/DL — SIGNIFICANT CHANGE UP (ref 1.6–2.6)
MCHC RBC-ENTMCNC: 32.8 GM/DL — SIGNIFICANT CHANGE UP (ref 32–36)
MCHC RBC-ENTMCNC: 35.3 PG — HIGH (ref 27–34)
MCV RBC AUTO: 107.4 FL — HIGH (ref 80–100)
MONOCYTES # BLD AUTO: 0.3 K/UL — SIGNIFICANT CHANGE UP (ref 0–0.9)
MONOCYTES NFR BLD AUTO: 13.1 % — SIGNIFICANT CHANGE UP (ref 2–14)
NEUTROPHILS # BLD AUTO: 1.1 K/UL — LOW (ref 1.8–7.4)
NEUTROPHILS NFR BLD AUTO: 43.1 % — SIGNIFICANT CHANGE UP (ref 43–77)
PHOSPHATE SERPL-MCNC: 3.9 MG/DL — SIGNIFICANT CHANGE UP (ref 2.5–4.5)
PLATELET # BLD AUTO: 68 K/UL — LOW (ref 150–400)
POTASSIUM SERPL-MCNC: 4.3 MMOL/L — SIGNIFICANT CHANGE UP (ref 3.5–5.3)
POTASSIUM SERPL-SCNC: 4.3 MMOL/L — SIGNIFICANT CHANGE UP (ref 3.5–5.3)
PROT SERPL-MCNC: 6.1 G/DL — SIGNIFICANT CHANGE UP (ref 6–8.3)
RBC # BLD: 3.65 M/UL — LOW (ref 3.8–5.2)
RBC # FLD: 11.6 % — SIGNIFICANT CHANGE UP (ref 10.3–14.5)
SODIUM SERPL-SCNC: 138 MMOL/L — SIGNIFICANT CHANGE UP (ref 135–145)
WBC # BLD: 2.5 K/UL — LOW (ref 3.8–10.5)
WBC # FLD AUTO: 2.5 K/UL — LOW (ref 3.8–10.5)

## 2018-03-14 RX ORDER — SODIUM CHLORIDE 9 MG/ML
1000 INJECTION, SOLUTION INTRAVENOUS
Qty: 0 | Refills: 0 | Status: CANCELLED | OUTPATIENT
Start: 2019-02-10 | End: 2018-03-15

## 2018-03-14 RX ADMIN — Medication 40 MILLIGRAM(S): at 13:36

## 2018-03-14 RX ADMIN — ERGOCALCIFEROL 50000 UNIT(S): 1.25 CAPSULE ORAL at 13:36

## 2018-03-14 NOTE — PROGRESS NOTE ADULT - ASSESSMENT
27 years old female with PMH of ETOH abuse (completed Detox program), depression and eating disorder (combination of anorexia nervosa and bulimia) brought to ED c/o seizure . Admitted to medicine for alcohol withdrawal seizures 27 years old female with PMH of ETOH abuse (completed Detox program), depression and eating disorder (combination of anorexia nervosa and bulimia) brought to ED c/o seizure. Admitted to medicine for alcohol withdrawal seizures

## 2018-03-14 NOTE — PROGRESS NOTE ADULT - PROBLEM SELECTOR PLAN 1
-No prior history of seizure  - Now only on prn Ativan. She is Stable for d/c . 24 hr notice given . will leave in am  - Leaves floor without informing anyone all the time. Nursing informed to be vigilant as concern for drug abuse .   - Continue Ciwa protocol  - Will go to rehab after discharge  - CT head on admission negative for any acute events.  - Completed banana bag with multivitamins, thiamine and folic acid - No prior history of seizure  - Now only on prn Ativan. She is Stable for d/c . 24 hr notice given . will leave in am  - Leaves floor without informing anyone all the time. Nursing informed to be vigilant as concern for drug abuse .   - Continue Ciwa protocol  - Will go to rehab after discharge  - CT head on admission negative for any acute events.  - Completed banana bag with multivitamins, thiamine and folic acid

## 2018-03-14 NOTE — PROGRESS NOTE ADULT - PROBLEM SELECTOR PLAN 3
- Thrombocytopenia and leukopenia Likely from bone marrow suppression secondary to heavy alcohol use  - No signs of active bleeding,fevers or infection supportive care and c/w home meds. Patient is on Prozac 40 mg .

## 2018-03-14 NOTE — PROGRESS NOTE ADULT - PROBLEM SELECTOR PLAN 2
supportive care and c/w home meds. Patient is on Prozac 40 mg . Low sugars in the morning likely secondary to poor PO intake versus roseanne phenomenon   •will get  accuchecks  and fs at 3.00 am  •Continue d5

## 2018-03-14 NOTE — PROGRESS NOTE ADULT - PROBLEM SELECTOR PLAN 4
Vitamin D supplement started - Thrombocytopenia and leukopenia Likely from bone marrow suppression secondary to heavy alcohol use  - No signs of active bleeding,fevers or infection - Thrombocytopenia and leukopenia Likely from bone marrow suppression secondary to heavy alcohol use  - No signs of active bleeding, fevers or infection

## 2018-03-14 NOTE — PROGRESS NOTE ADULT - SUBJECTIVE AND OBJECTIVE BOX
Patient is a 27y old  Female who presents with a chief complaint of alcohol withdrawal seizure x today (10 Mar 2018 17:56)      INTERVAL HPI/OVERNIGHT EVENTS:None     MEDICATIONS  (STANDING):  dextrose 5% + sodium chloride 0.9%. 1000 milliLiter(s) (100 mL/Hr) IV Continuous <Continuous>  ergocalciferol 61690 Unit(s) Oral every week  FLUoxetine 40 milliGRAM(s) Oral daily  sodium chloride 0.9% 1000 milliLiter(s) (100 mL/Hr) IV Continuous <Continuous>    MEDICATIONS  (PRN):  LORazepam   Injectable 2 milliGRAM(s) IV Push every 2 hours PRN CIWA > 7 / Breakthrough seizure      Allergies    No Known Allergies          REVIEW OF SYSTEMS:  CONSTITUTIONAL: No fever, weight loss, or fatigue  EYES: No eye pain, visual disturbances, or discharge  ENMT:  No difficulty hearing, tinnitus, vertigo;   RESPIRATORY: No cough, wheezing, chills or hemoptysis; No shortness of breath  CARDIOVASCULAR: No chest pain, palpitations, dizziness, or leg swelling  GASTROINTESTINAL: No abdominal or epigastric pain. No nausea, vomiting, or hematemesis; No diarrhea or constipation. No melena or hematochezia.  GENITOURINARY: No dysuria, frequency, hematuria, or incontinence  NEUROLOGICAL: No headaches, memory loss, loss of strength, numbness, or tremors    Vital Signs Last 24 Hrs  T(C): 36.9 (14 Mar 2018 04:55), Max: 36.9 (14 Mar 2018 04:55)  T(F): 98.4 (14 Mar 2018 04:55), Max: 98.4 (14 Mar 2018 04:55)  HR: 67 (14 Mar 2018 07:06) (48 - 73)  BP: 94/62 (14 Mar 2018 07:06) (86/49 - 94/62)  BP(mean): --  RR: 16 (14 Mar 2018 04:55) (16 - 16)  SpO2: 95% (14 Mar 2018 04:55) (95% - 100%)    PHYSICAL EXAM:  GENERAL: NAD, well-groomed, well-developed  HEAD:  Atraumatic, Normocephalic  EYES: EOMI, PERRLA, conjunctiva and sclera clear  ENMT: No tonsillar erythema, exudates, or enlargement; Moist mucous membranes, Good dentition, No lesions  NECK: Supple, No JVD, Normal thyroid  NERVOUS SYSTEM:  Alert & Oriented X3, Good concentration; Motor Strength 5/5 B/L upper and lower extremities;   CHEST/LUNG: Clear to percussion bilaterally; No rales, rhonchi, wheezing, or rubs  HEART: Regular rate and rhythm; No murmurs, rubs, or gallops  ABDOMEN: Soft, Nontender, Nondistended; Bowel sounds present  EXTREMITIES:  2+ Peripheral Pulses, No clubbing, cyanosis, or edema  LYMPH: No lymphadenopathy noted  SKIN: No rashes or lesions    LABS:                        12.9   2.5   )-----------( 68       ( 14 Mar 2018 08:40 )             39.2     03-14    138  |  107  |  5<L>  ----------------------------<  68<L>  4.3   |  24  |  0.44<L>    Ca    8.6      14 Mar 2018 08:40  Phos  3.9     03-14  Mg     2.2     03-14    TPro  6.1  /  Alb  2.7<L>  /  TBili  0.6  /  DBili  x   /  AST  66<H>  /  ALT  36  /  AlkPhos  113  03-14        CAPILLARY BLOOD GLUCOSE          RADIOLOGY & ADDITIONAL TESTS:    Imaging Personally Reviewed:  [ ] YES  [ ] NO    Consultant(s) Notes Reviewed:  [ ] YES  [ ] NO    Care Discussed with Consultants/Other Providers [ ] YES  [ ] NO Patient is a 27y old  Female who presents with a chief complaint of alcohol withdrawal seizure (10 Mar 2018 17:56)      INTERVAL HPI/OVERNIGHT EVENTS:None     MEDICATIONS  (STANDING):  dextrose 5% + sodium chloride 0.9%. 1000 milliLiter(s) (100 mL/Hr) IV Continuous <Continuous>  ergocalciferol 30322 Unit(s) Oral every week  FLUoxetine 40 milliGRAM(s) Oral daily  sodium chloride 0.9% 1000 milliLiter(s) (100 mL/Hr) IV Continuous <Continuous>    MEDICATIONS  (PRN):  LORazepam   Injectable 2 milliGRAM(s) IV Push every 2 hours PRN CIWA > 7 / Breakthrough seizure      Allergies    No Known Allergies          REVIEW OF SYSTEMS:  CONSTITUTIONAL: No fever, weight loss, or fatigue  EYES: No eye pain, visual disturbances, or discharge  ENMT:  No difficulty hearing, tinnitus, vertigo;   RESPIRATORY: No cough, wheezing, chills or hemoptysis; No shortness of breath  CARDIOVASCULAR: No chest pain, palpitations, dizziness, or leg swelling  GASTROINTESTINAL: No abdominal or epigastric pain. No nausea, vomiting, or hematemesis; No diarrhea or constipation. No melena or hematochezia.  GENITOURINARY: No dysuria, frequency, hematuria, or incontinence  NEUROLOGICAL: No headaches, memory loss, loss of strength, numbness, or tremors  All other ROS are negative    Vital Signs Last 24 Hrs  T(C): 36.9 (14 Mar 2018 04:55), Max: 36.9 (14 Mar 2018 04:55)  T(F): 98.4 (14 Mar 2018 04:55), Max: 98.4 (14 Mar 2018 04:55)  HR: 67 (14 Mar 2018 07:06) (48 - 73)  BP: 94/62 (14 Mar 2018 07:06) (86/49 - 94/62)  RR: 16 (14 Mar 2018 04:55) (16 - 16)  SpO2: 95% (14 Mar 2018 04:55) (95% - 100%)    PHYSICAL EXAM:  GENERAL: NAD, well-groomed, well-developed  HEAD:  Atraumatic, Normocephalic  EYES: EOMI, PERRL, conjunctiva and sclera clear  ENMT: No tonsillar erythema, exudates, or enlargement; Moist mucous membranes, Good dentition, No lesions  NECK: Supple, No JVD, Normal thyroid  NERVOUS SYSTEM:  Alert & Oriented X3, Good concentration; Motor Strength 5/5 B/L upper and lower extremities;   CHEST/LUNG: Clear to percussion bilaterally; No rales, rhonchi, wheezing, or rubs  HEART: Regular rate and rhythm; No murmurs, rubs, or gallops  ABDOMEN: Soft, Nontender, Nondistended; Bowel sounds present  EXTREMITIES:  2+ Peripheral Pulses, No clubbing, cyanosis, or edema  LYMPH: No lymphadenopathy noted  SKIN: No rashes or lesions    LABS:                        12.9   2.5   )-----------( 68       ( 14 Mar 2018 08:40 )             39.2     03-14    138  |  107  |  5<L>  ----------------------------<  68<L>  4.3   |  24  |  0.44<L>    Ca    8.6      14 Mar 2018 08:40  Phos  3.9     03-14  Mg     2.2     03-14    TPro  6.1  /  Alb  2.7<L>  /  TBili  0.6  /  DBili  x   /  AST  66<H>  /  ALT  36  /  AlkPhos  113  03-14        CAPILLARY BLOOD GLUCOSE          RADIOLOGY & ADDITIONAL TESTS:    Imaging Personally Reviewed:  [x] YES  [ ] NO    Consultant(s) Notes Reviewed:  [x] YES  [ ] NO    Care Discussed with Consultants/Other Providers [x] YES  [ ] NO

## 2018-03-14 NOTE — PROGRESS NOTE ADULT - ATTENDING COMMENTS
Kimber Zapata MD
Patient seen and examined. Patient's history, vitals, labs, imaging studies reviewed. Discussed with above resident, agree with note with edits. Plan of care discussed with patient, and agrees, all questions answered.   Kimber Zapata MD
Patient seen and examined. Patient's history, vitals, labs, imaging studies reviewed. Discussed with above resident, agree with note with edits. Plan of care discussed with patient, and agrees, all questions answered. D/c planning for tomorrow  Kimber Zapata MD
Patient seen and examined. Patient's history, vitals, labs, imaging studies reviewed. Discussed with above resident, agree with note with edits. Plan of care discussed with patient, and agrees, all questions answered. D/c planning  Kimber Zapata MD

## 2018-03-15 VITALS
RESPIRATION RATE: 16 BRPM | HEART RATE: 70 BPM | OXYGEN SATURATION: 98 % | TEMPERATURE: 98 F | DIASTOLIC BLOOD PRESSURE: 63 MMHG | SYSTOLIC BLOOD PRESSURE: 96 MMHG

## 2018-03-15 RX ADMIN — Medication 40 MILLIGRAM(S): at 12:14

## 2018-03-15 NOTE — DISCHARGE NOTE ADULT - PLAN OF CARE
Prevent further episodes will go to rehab after discharge optimise quality of life continue with prozac Pancytopenia especially Thrombocytopenia and luekopenia Likely from bone marrow suppression secondary to heavy alcohol use. No signs of active bleeding,fevers or infection . No further work up for this at this time per attending . follow up labs

## 2018-03-15 NOTE — DISCHARGE NOTE ADULT - VISION (WITH CORRECTIVE LENSES IF THE PATIENT USUALLY WEARS THEM):
pt wearing reading glasses/Normal vision: sees adequately in most situations; can see medication labels, newsprint

## 2018-03-15 NOTE — DISCHARGE NOTE ADULT - OTHER SIGNIFICANT FINDINGS
Medicine attending note:  Patient is medically stable for discharge today. Educated on alcohol cessation, patient verbalized agreement.  Kimber Zapata MD  Time spent > 45 on discharge services  3/15/2018

## 2018-03-15 NOTE — DISCHARGE NOTE ADULT - PATIENT PORTAL LINK FT
You can access the [facility] Patient Portal, offered by [facility], by registering with the following website: [email or web address]

## 2018-03-15 NOTE — DISCHARGE NOTE ADULT - MEDICATION SUMMARY - MEDICATIONS TO TAKE
I will START or STAY ON the medications listed below when I get home from the hospital:    PROzac 40 mg oral capsule  -- 1 cap(s) by mouth once a day  -- Indication: For Depression

## 2018-03-15 NOTE — DISCHARGE NOTE ADULT - HOSPITAL COURSE
27 years old female with PMH of ETOH abuse (completed Detox program, follows with councellor), depression and eating disorder (combination of anorexia nervosa and bulimia) brought to ED c/o seizure. Patient was at airport to fly to [place] after recently completing the Detox program, when she had an episode of witnessed seizure at the airport.   Admitted to medicine for alcohol withdrawal seizures . On Ativan and Ciwa protocol. CT head on admission negative for any acute events.Completed banana bag with multivitamins, thiamine and folic acid . Pancytopenia especially Thrombocytopenia and luekopenia Likely from bone marrow suppression secondary to heavy alcohol use. No signs of active bleeding,fevers or infection . No further work up for this at this time per attending . Continue Prozac 40 mg  for Depression and eating disorder.  Patient stable for discharge per attending . Patient & family member educated about importance of medication compliance & physician follow up.All concerns & questions have been appropriately adressed. 27 years old female with PMH of ETOH abuse (completed Detox program, follows with councellor), depression and eating disorder (combination of anorexia nervosa and bulimia) brought to ED c/o seizure. Patient was at airport to fly to [place] after recently completing the Detox program, when she had an episode of witnessed seizure at the airport.   Admitted to medicine for alcohol withdrawal seizures . On Ativan and Ciwa protocol. CT head on admission negative for any acute events.Completed banana bag with multivitamins, thiamine and folic acid. Thrombocytopenia and leukopenia likely from bone marrow suppression secondary to heavy alcohol use. No signs of active bleeding, fevers, or infection. No further work up for this at this time per attending. Continue Prozac 40 mg for depression and eating disorder.  Patient stable for discharge per attending . Patient & family member educated about importance of medication compliance, alcohol cessation, & physician follow up. All concerns & questions have been appropriately addressed

## 2018-03-15 NOTE — DISCHARGE NOTE ADULT - CARE PLAN
Principal Discharge DX:	Alcohol withdrawal seizure without complication  Goal:	Prevent further episodes  Assessment and plan of treatment:	will go to rehab after discharge  Secondary Diagnosis:	Depression  Goal:	optimise quality of life  Assessment and plan of treatment:	continue with prozac  Secondary Diagnosis:	Pancytopenia  Assessment and plan of treatment:	Pancytopenia especially Thrombocytopenia and luekopenia Likely from bone marrow suppression secondary to heavy alcohol use. No signs of active bleeding,fevers or infection . No further work up for this at this time per attending . follow up labs

## 2018-03-18 PROBLEM — F50.9 EATING DISORDER: Status: ACTIVE | Noted: 2018-03-18

## 2018-03-24 PROBLEM — IMO0002 ALCOHOL USE DISORDER: Status: ACTIVE | Noted: 2018-03-24

## 2018-03-24 PROBLEM — F41.9 ANXIETY DISORDER, UNSPECIFIED: Status: ACTIVE | Noted: 2018-03-24

## 2018-03-24 PROBLEM — F33.2 SEVERE RECURRENT MAJOR DEPRESSION WITHOUT PSYCHOTIC FEATURES (HCC): Status: ACTIVE | Noted: 2018-03-24

## 2018-04-10 PROBLEM — F10.20 ALCOHOL USE DISORDER, SEVERE, DEPENDENCE (HCC): Status: ACTIVE | Noted: 2018-04-10

## 2018-05-22 ENCOUNTER — LAB ENCOUNTER (OUTPATIENT)
Dept: LAB | Facility: HOSPITAL | Age: 28
End: 2018-05-22
Attending: PHYSICIAN ASSISTANT

## 2018-05-22 DIAGNOSIS — F50.9 EATING DISORDER: ICD-10-CM

## 2018-05-22 PROCEDURE — 80053 COMPREHEN METABOLIC PANEL: CPT

## 2018-05-22 PROCEDURE — 85025 COMPLETE CBC W/AUTO DIFF WBC: CPT

## 2018-05-22 PROCEDURE — 83735 ASSAY OF MAGNESIUM: CPT

## 2018-05-22 PROCEDURE — 36415 COLL VENOUS BLD VENIPUNCTURE: CPT

## 2018-05-23 PROCEDURE — 83735 ASSAY OF MAGNESIUM: CPT

## 2018-05-23 PROCEDURE — 80048 BASIC METABOLIC PNL TOTAL CA: CPT

## 2018-05-23 PROCEDURE — 82607 VITAMIN B-12: CPT

## 2018-05-23 PROCEDURE — 82746 ASSAY OF FOLIC ACID SERUM: CPT

## 2018-05-23 PROCEDURE — 80307 DRUG TEST PRSMV CHEM ANLYZR: CPT

## 2018-05-23 PROCEDURE — 82962 GLUCOSE BLOOD TEST: CPT

## 2018-05-23 PROCEDURE — 85027 COMPLETE CBC AUTOMATED: CPT

## 2018-05-23 PROCEDURE — 93005 ELECTROCARDIOGRAM TRACING: CPT

## 2018-05-23 PROCEDURE — 80076 HEPATIC FUNCTION PANEL: CPT

## 2018-05-23 PROCEDURE — 80053 COMPREHEN METABOLIC PANEL: CPT

## 2018-05-23 PROCEDURE — 99285 EMERGENCY DEPT VISIT HI MDM: CPT | Mod: 25

## 2018-05-23 PROCEDURE — 82306 VITAMIN D 25 HYDROXY: CPT

## 2018-05-23 PROCEDURE — 81025 URINE PREGNANCY TEST: CPT

## 2018-05-23 PROCEDURE — 83036 HEMOGLOBIN GLYCOSYLATED A1C: CPT

## 2018-05-23 PROCEDURE — 90686 IIV4 VACC NO PRSV 0.5 ML IM: CPT

## 2018-05-23 PROCEDURE — 70450 CT HEAD/BRAIN W/O DYE: CPT

## 2018-05-23 PROCEDURE — 84100 ASSAY OF PHOSPHORUS: CPT

## 2018-05-29 PROBLEM — F41.1 GAD (GENERALIZED ANXIETY DISORDER): Status: ACTIVE | Noted: 2018-05-29

## 2018-05-29 PROBLEM — F40.10 SOCIAL ANXIETY DISORDER: Status: ACTIVE | Noted: 2018-05-29

## 2018-05-29 PROBLEM — F10.20 ALCOHOL USE DISORDER, MODERATE, DEPENDENCE (HCC): Status: ACTIVE | Noted: 2018-04-10

## 2018-11-06 ENCOUNTER — HOSPITAL ENCOUNTER (EMERGENCY)
Facility: HOSPITAL | Age: 28
Discharge: HOME | End: 2018-11-07
Attending: EMERGENCY MEDICINE
Payer: COMMERCIAL

## 2018-11-06 DIAGNOSIS — Z00.8 MEDICAL CLEARANCE FOR PSYCHIATRIC ADMISSION: Primary | ICD-10-CM

## 2018-11-06 DIAGNOSIS — F10.10 ALCOHOL ABUSE: ICD-10-CM

## 2018-11-06 PROCEDURE — 96360 HYDRATION IV INFUSION INIT: CPT

## 2018-11-06 PROCEDURE — 99285 EMERGENCY DEPT VISIT HI MDM: CPT

## 2018-11-06 PROCEDURE — 96361 HYDRATE IV INFUSION ADD-ON: CPT

## 2018-11-06 PROCEDURE — 81025 URINE PREGNANCY TEST: CPT

## 2018-11-07 VITALS
HEART RATE: 75 BPM | WEIGHT: 125 LBS | TEMPERATURE: 97 F | SYSTOLIC BLOOD PRESSURE: 97 MMHG | BODY MASS INDEX: 21.34 KG/M2 | OXYGEN SATURATION: 96 % | HEIGHT: 64 IN | RESPIRATION RATE: 14 BRPM | DIASTOLIC BLOOD PRESSURE: 68 MMHG

## 2018-11-07 PROCEDURE — 84480 ASSAY TRIIODOTHYRONINE (T3): CPT | Performed by: EMERGENCY MEDICINE

## 2018-11-07 PROCEDURE — 83735 ASSAY OF MAGNESIUM: CPT | Performed by: EMERGENCY MEDICINE

## 2018-11-07 PROCEDURE — 80053 COMPREHEN METABOLIC PANEL: CPT | Performed by: EMERGENCY MEDICINE

## 2018-11-07 PROCEDURE — 82150 ASSAY OF AMYLASE: CPT | Performed by: EMERGENCY MEDICINE

## 2018-11-07 PROCEDURE — 85025 COMPLETE CBC W/AUTO DIFF WBC: CPT | Performed by: EMERGENCY MEDICINE

## 2018-11-07 PROCEDURE — 81003 URINALYSIS AUTO W/O SCOPE: CPT | Performed by: EMERGENCY MEDICINE

## 2018-11-07 PROCEDURE — 80329 ANALGESICS NON-OPIOID 1 OR 2: CPT | Performed by: EMERGENCY MEDICINE

## 2018-11-07 PROCEDURE — 81025 URINE PREGNANCY TEST: CPT | Performed by: EMERGENCY MEDICINE

## 2018-11-07 PROCEDURE — 80320 DRUG SCREEN QUANTALCOHOLS: CPT | Performed by: EMERGENCY MEDICINE

## 2018-11-07 PROCEDURE — 84100 ASSAY OF PHOSPHORUS: CPT | Performed by: EMERGENCY MEDICINE

## 2018-11-07 PROCEDURE — 84443 ASSAY THYROID STIM HORMONE: CPT | Performed by: EMERGENCY MEDICINE

## 2018-11-07 PROCEDURE — 80307 DRUG TEST PRSMV CHEM ANLYZR: CPT | Performed by: EMERGENCY MEDICINE

## 2018-11-07 RX ORDER — SODIUM CHLORIDE 9 MG/ML
INJECTION, SOLUTION INTRAVENOUS CONTINUOUS
Status: DISCONTINUED | OUTPATIENT
Start: 2018-11-07 | End: 2018-11-07

## 2018-11-07 NOTE — ED PROVIDER NOTES
Patient Seen in: [facility] Emergency Department    History   Patient presents with:  Medical Clearance (constitutional)    Stated Complaint: eval p medical clearance    HPI    50-year-old woman presents emergency department for medical clearance. Cardiovascular: Normal rate and intact distal pulses. Pulmonary/Chest: Effort normal. No respiratory distress. Abdominal: Soft. She exhibits no distension. There is no tenderness. Musculoskeletal: Normal range of motion. She exhibits no tenderness. Abnormality         Status                     ---------                               -----------         ------                     CBC W/ DIFFERENTIAL[[id number]]          Abnormal            Final result                 Please view results for these blaze

## 2018-11-07 NOTE — ED NOTES
Pt informed of bed assigned on CDU. Pt will be transported to [facility] by her father.

## 2018-11-07 NOTE — ED INITIAL ASSESSMENT (HPI)
Referral from [facility] here for ETOH detox here for medical clearance. Denies drug use or SI. Admits to drinking tonight.

## 2018-11-07 NOTE — ED NOTES
Pt having 2nd thoughts about going to detox. Spoke w/ pt about liver disease and about addiction. Pt tearful. Alexandro blackwood.

## 2018-11-10 NOTE — ED INITIAL ASSESSMENT (HPI)
Here for voluntary alcohol detox. Sent from [facility] for medical clearance. Had 1 bottle of vodka today. States gets shakes from alcohol withdrawal. Had seizure back in march from alcohol withdrawal. Was seen here last Tuesday but did not follow up with DANIEL.

## 2018-11-10 NOTE — ED PROVIDER NOTES
Patient Seen in: [facility] Emergency Department    History   Patient presents with:  Eval-P (psychiatric)    Stated Complaint: voluntary alcohol detox, sent from [facility] for medical clearance.      HPI    50-year-old female presents emergency room reque supple, there is no nuchal rigidity. No carotid bruits. No masses. Trachea midline. No cervical lymphadenopathy. HEART: Regular rate and rhythm, no murmurs. LUNGS: Clear to auscultation bilaterally. No Rales, no rhonchi, no wheezing, no stridor.   AB pulse ox. Patient was recently evaluated by [facility] staff and given referrals, patient is not suicidal or homicidal.  Patient wishes to follow-up with outpatient treatment, patient is not actively withdrawing at this time.   Patient to return if any change wor

## 2018-11-10 NOTE — BH LEVEL OF CARE ASSESSMENT
Level of Care Assessment Note                   Suicide Risk  1. Have you wished you were dead or wished you could go to sleep and not wake up? (past 30 days): No  2. Have you actually had any thoughts of killing yourself? (past 30 days): No  6.  Have you e

## 2018-11-11 ENCOUNTER — HOSPITAL ENCOUNTER (EMERGENCY)
Facility: HOSPITAL | Age: 28
Discharge: NURSING, REHAB OR LONG-TERM CARE | End: 2018-11-12
Attending: EMERGENCY MEDICINE
Payer: COMMERCIAL

## 2018-11-11 DIAGNOSIS — F32.A DEPRESSION, UNSPECIFIED DEPRESSION TYPE: Primary | ICD-10-CM

## 2018-11-11 DIAGNOSIS — F10.10 ALCOHOL ABUSE: ICD-10-CM

## 2018-11-11 DIAGNOSIS — R45.851 SUICIDAL IDEATION: ICD-10-CM

## 2018-11-11 PROCEDURE — 80329 ANALGESICS NON-OPIOID 1 OR 2: CPT | Performed by: EMERGENCY MEDICINE

## 2018-11-11 PROCEDURE — 99285 EMERGENCY DEPT VISIT HI MDM: CPT

## 2018-11-11 PROCEDURE — 80320 DRUG SCREEN QUANTALCOHOLS: CPT | Performed by: EMERGENCY MEDICINE

## 2018-11-11 PROCEDURE — 81025 URINE PREGNANCY TEST: CPT

## 2018-11-11 PROCEDURE — 85025 COMPLETE CBC W/AUTO DIFF WBC: CPT | Performed by: EMERGENCY MEDICINE

## 2018-11-11 PROCEDURE — 36415 COLL VENOUS BLD VENIPUNCTURE: CPT

## 2018-11-11 PROCEDURE — 82962 GLUCOSE BLOOD TEST: CPT

## 2018-11-11 PROCEDURE — 80307 DRUG TEST PRSMV CHEM ANLYZR: CPT | Performed by: EMERGENCY MEDICINE

## 2018-11-11 PROCEDURE — 80053 COMPREHEN METABOLIC PANEL: CPT | Performed by: EMERGENCY MEDICINE

## 2018-11-11 RX ORDER — SODIUM CHLORIDE 9 MG/ML
1000 INJECTION, SOLUTION INTRAVENOUS ONCE
Status: COMPLETED | OUTPATIENT
Start: 2018-11-11 | End: 2018-11-12

## 2018-11-12 VITALS
OXYGEN SATURATION: 97 % | SYSTOLIC BLOOD PRESSURE: 96 MMHG | HEIGHT: 64 IN | DIASTOLIC BLOOD PRESSURE: 75 MMHG | BODY MASS INDEX: 24.75 KG/M2 | RESPIRATION RATE: 12 BRPM | WEIGHT: 145 LBS | HEART RATE: 58 BPM | TEMPERATURE: 97 F

## 2018-11-12 NOTE — ED NOTES
Pt belongings lock up bag number [id number]   Shoes   Socks   Shirt   Scarf   Pants   2 nose rings and one ring  Note book   [facility]

## 2018-11-12 NOTE — ED NOTES
Ema Section, [facility]- no beds  [facility] with [employer]- awaiting call back if they can take the referral

## 2018-11-12 NOTE — ED INITIAL ASSESSMENT (HPI)
PT in her car with a written suicide note and razor blade. PT called suicide hotline.  Police were called and brought PT in.

## 2018-11-12 NOTE — BH LEVEL OF CARE ASSESSMENT
Level of Care Assessment Note    General Questions  Why are you here?: Pt is a 33 yr old female who arrived to the ER via ambulance due to Pargi 1. Pt states \"I was calling to get into rehab then apparently got transferred.  The police tracked my phone to hunt note.\"    Family Collateral  Family Collateral: none available  Reason Patient is Here Today: na  Family's Biggest Areas of Concern: na    Referral Source  Referral Source: Kesha 3: [facility]  Person/Contact Name: [facility]    Suicide Ris property or thought about it?: No    Access to Means  Has access to means to attempt suicide or harm others or property: Yes  Description of Access: razor  Access to Firearm/Weapon: No  Do you have a firearm owner ID card?: No  Collateral for any access to that  Anxiety Symptoms: Generalized;Panic attack; Shaking; Shortness of breath; Other (Comment)  Panic Attacks: pt states last panic attacks was today, states has them \"1x a week or so\"; sx: \"shake, hard to breath, all I want to do is disappear into a hole Cherylle Flax  Has anyone in your family ever been diagnosed with an eating disorder?            : No    Weight Change in Past Three Months  Have you noticed any changes in your weight within the past three months?: Increase  How much?: 35(per previous assessmen have you restricted intake?: 4-7 times a week  How have you restricted your food intake in the past 30 days?: Skipping entire meals  Please describe in detail the patient's restricting behavior. : denies restricting anything specific from her diet other th exercise to you complete each week?: None(per previous assessment)    Weight Loss Surgery  Have you ever had gastric bypass surgery or a similar surgery for weight loss?: No(per previous assessment)    Symptoms and Consequences of Behavior within the past Withdrawal Symptoms: No  Breathalyzer: 286( at 8:23pm on 11/11/18)    Compulsive Behaviors  Are you/others concerned about any of the following behaviors over the past 30 days?: Denies                                                   Abuse Assessme work. Pt states things have been going downhill since then. Pt reports drinking daily recently and today she drank a half bottle of vodka. Pt's BAL was 286 at 8:23pm on 11/11/18.  Pt denies current withdrawal sx and reports a hx of withdrawal related seizur Observation;Suicide  Medical Precautions: Seizure

## 2018-11-12 NOTE — ED NOTES
Nicolasa [address], [facility]- no beds  [facility] with [employer]

## 2018-11-12 NOTE — ED NOTES
Pt accepted at [facility]. Accepting doctor is Dr. Lorena Tineo.

## 2018-11-12 NOTE — ED NOTES
Called and left message for PT's parents per their request for update on PT's plan of care. This was verified with the PT and she is ok with plan.

## 2018-11-12 NOTE — ED NOTES
Mother informed of PT's transfer to [facility]. She would like to be called once the PT is settled in. This RN will pass along the information.

## 2018-11-12 NOTE — BH LEVEL OF CARE ASSESSMENT
Level of Care Assessment Note    General Questions  Why are you here?: Pt is a 33 yr old female who arrived to the ER via ambulance due to Pargi 1. Pt states \"I was calling to get into rehab then apparently got transferred.  The police tracked my phone to hunt note.\"    Family Collateral  Family Collateral: none available  Reason Patient is Here Today: na  Family's Biggest Areas of Concern: na    Referral Source  Referral Source: Kesha 3: [facility]  Person/Contact Name: [facility]    Suicide Ris property or thought about it?: No    Access to Means  Has access to means to attempt suicide or harm others or property: Yes  Description of Access: razor  Access to Firearm/Weapon: No  Do you have a firearm owner ID card?: No  Collateral for any access to that  Anxiety Symptoms: Generalized;Panic attack; Shaking; Shortness of breath; Other (Comment)  Panic Attacks: pt states last panic attacks was today, states has them \"1x a week or so\"; sx: \"shake, hard to breath, all I want to do is disappear into a hole Jacinta Arenas  Has anyone in your family ever been diagnosed with an eating disorder?            : No    Weight Change in Past Three Months  Have you noticed any changes in your weight within the past three months?: Increase  How much?: 35(per previous assessmen have you restricted intake?: 4-7 times a week  How have you restricted your food intake in the past 30 days?: Skipping entire meals  Please describe in detail the patient's restricting behavior. : denies restricting anything specific from her diet other th exercise to you complete each week?: None(per previous assessment)    Weight Loss Surgery  Have you ever had gastric bypass surgery or a similar surgery for weight loss?: No(per previous assessment)    Symptoms and Consequences of Behavior within the past Withdrawal Symptoms: No  Breathalyzer: 286( at 8:23pm on 11/11/18)    Compulsive Behaviors  Are you/others concerned about any of the following behaviors over the past 30 days?: Denies                                                   Abuse Assessme transferred her to a crisis hotline. Pt reports telling them she had razor blades and was ready to use them. Police were contacted by the crisis hotline and [employer] found pt in her car with razor blades and a suicide note.  Pt states she wrote the suicide note Alcohol Use Disorder: Severe  Feeding and Eating Disorders: Unspecified Feeding or Eating Disorder     Pervasive Diagnoses  Neurodevelopmental Disorders: Deferred  Personality Disorders: Deferred  Pertinent Non-psychiatric Diagnoses: none

## 2018-11-12 NOTE — ED NOTES
Call received from lab, glucose=64. Dr Yovani Yoon aware and orders received to give patient orange juice.  Orange juice provided to patient

## 2018-11-12 NOTE — ED PROVIDER NOTES
Patient Seen in: [facility] Emergency Department    History   Patient presents with:  Eval-P (psychiatric)    Stated Complaint:     HPI    The patient is a 24-year-old female who presents emergency room with a history of being in her car tonight and Pupils are 4 mm equally round and reactive to light. Oropharynx is clear. Mucous membranes are moist.  NECK: There is no focal tenderness to palpation appreciated. There is no JVD. No meningeal signs or nuchal rigidity appreciated. No stridor.   LUNGS: Adri following components:    RDW-SD 54.6 (*)     All other components within normal limits   ACETAMINOPHEN (TYLENOL), S - Normal   SALICYLATE, SERUM - Normal   POCT GLUCOSE - Normal   CBC WITH DIFFERENTIAL WITH PLATELET    Narrative:      The following orders w on file for this visit. Follow-up:  No follow-up provider specified.       Medications Prescribed:  Current Discharge Medication List

## 2018-11-12 NOTE — ED NOTES
Jd- called back stating their program isn't appropriate for pt   [facility]- clinical faxed

## 2019-07-10 NOTE — ED PROVIDER NOTES
Patient Seen in: [facility] Emergency Department    History   Patient presents with:  Eval-P (psychiatric)  Alcohol Intoxication (neurologic)    Stated Complaint: Eval p ETOH    HPI    Patient is a 30-year-old female with a history of alcohol abuse, Course     Labs Reviewed   COMP METABOLIC PANEL (14) - Abnormal; Notable for the following components:       Result Value    BUN 5 (*)     BUN/CREA Ratio 6.7 (*)     AST 72 (*)     ALT 58 (*)     Alkaline Phosphatase 118 (*)     Total Protein 8.3 (*)     A DRAW LAVENDER   RAINBOW DRAW LIGHT GREEN   RAINBOW DRAW GOLD          Patient presents acutely intoxicated with suicidal ideation and plan. Will need hospitalization. Will have Corine Templeton evaluate once sober. At work reviewed. Alcohol level is 426.

## 2019-07-10 NOTE — ED NOTES
Patient to nurses station tearful at this time, asking to sign out AMA. RN redirected patient back to patients room. Educated patient on why patient is unable to leave at this time, patient verbalizes understanding.  Patient states \"I promise I am not fabiola

## 2019-07-10 NOTE — ED NOTES
Pt to be discharged home. Notified Dr. Nick Grider patient will be using [employer] to get home. Dr. Nick Grider approved pt can go home via [employer],.

## 2019-07-10 NOTE — ED NOTES
Spoke to VIKY from [facility]. POC is to discharge patient home with referalls.

## 2019-07-10 NOTE — ED PROVIDER NOTES
The patient will need to be reassessed after 8:00am to assess her risk when she is sober. Patient has been cooperative. Informed the patient's CIWA was 15.   She received Ativan per protocol  Jarett Marcano did evaluate the patient and they did not believe t

## 2019-07-10 NOTE — ED NOTES
Patient ambulated to the bathroom with a steady gait. Warm blanket and pillow offered at this time, patient declines. Patient provided with fresh ice water at this time.

## 2019-07-10 NOTE — BH LEVEL OF CARE ASSESSMENT
Level of Care Assessment Note    General Questions  Why are you here?: \"I have had a rough couple of weeks. I was getting scared. I detoxed from alcohol before and it is not fun. I was gonna have to detox from a lot.  I was binge drinking, triggered by env year ago  Score -  OV: 6 - Medium Risk   Describe : last November walked into the [place] with a knife with the plan of freezing to death or cutting her wrists and ankles  Is your experience of thoughts of dying by suicide: A Solution to a Problem(\"a way nothing\" - feeling lonely and misunderstood  Object(s) Used: Finger nail;Metal  Describe Object(s) Used: fingernails or dull kitchen knife  Area(s) of Body Injured: Arm;Leg  Describe Area(s) of Body Injured: arms, legs, chest  Frequency: Rarely  Describe Score: 3     Diagnosed with Eating Disorder  Have you ever been diagnosed with an eating disorder?            : Yes  At what age were you diagnosed?: 31  What was your diagnosis?: anorexia, restricting; currently bulimic  Has anyone in your family ever bee your food intake in the past 30 days?: Limited portions or calories  Please describe in detail the patient's restricting behavior.  : pt eats small amounts of food combinations that are familiar such as chex cereal with strawberries or celery with peanut bu bypass surgery or a similar surgery for weight loss?: No    Symptoms and Consequences of Behavior within the past 30 days  Symptoms/Consequences of Behaviors experienced in the past 30 days: GI Problems; Constipation;Edema; Headache;Bloating; Heart Palpitatio been? : No                                                           Support for Recovery  Is your living environment a supportive place for recovery?: No  Describe: living alone while parents are in [place] ([facility]); can reach out to family members but has not Congruent to mood  Range of Affect: Normal  Stability of Affect: Stable  Attitude toward staff: Co-operative;Pleasant  Speech  Rate of Speech: Appropriate  Flow of Speech: Appropriate  Intensity of Volume: Ordinary  Clarity: Clear  Cognition  Concentration

## 2019-07-10 NOTE — BH LEVEL OF CARE ASSESSMENT
Level of Care Assessment Note    General Questions  Why are you here?: \"I got scared so I came here. \"    Precipitating Events: Pt stated that she was afraid that she could die from alcohol withdrawal.  Pt was assessed by PM clinician last night but her b make any arrangements for a friend to take care of her cat. She stated that she feels safe to return home at this time. Is your experience of thoughts of dying by suicide: Frightening; Other(Guilt )  Protective Factors: \"I know I would never actually g chest  Triggers to Self Injury: \"feeling nothing\" - feeling lonely and misunderstood  Object(s) Used: Finger nail;Metal  Describe Object(s) Used: fingernails or dull kitchen knife  Area(s) of Body Injured: Arm;Leg  Describe Area(s) of Body Injured: arms, Food dominates your life?: Yes  SCOFF Score: 3     Diagnosed with Eating Disorder  Have you ever been diagnosed with an eating disorder?            : Yes  At what age were you diagnosed?: 31  What was your diagnosis?: anorexia, restricting; currently bulim times a week  How have you restricted your food intake in the past 30 days?: Limited portions or calories  Please describe in detail the patient's restricting behavior.  : pt eats small amounts of food combinations that are familiar such as chex cereal with Loss Surgery  Have you ever had gastric bypass surgery or a similar surgery for weight loss?: No    Symptoms and Consequences of Behavior within the past 30 days  Symptoms/Consequences of Behaviors experienced in the past 30 days: GI Problems; Constipation; current use the most/worst it has ever been? : No                                                           Support for Recovery  Is your living environment a supportive place for recovery?: No  Describe: living alone while parents are in [place] ([facility]); can reach Feelings: Sadness; Anxious  Anxiety Level- DANIEL only: Mild  Appropriateness of Affect: Congruent to mood; Appropriate to situation  Range of Affect: Normal  Stability of Affect: Stable  Attitude toward staff: Pleasant; Co-operative  Speech  Rate of Speech: Rashid Number;Advised to call if condition worsens; Advised to call with questions    Primary Psychiatric Diagnosis  Substance Related and Addictive Disorders: Alcohol-Related Disorder - Alcohol use Disorder  Primary Alcohol Use Disorder: Severe

## 2019-07-11 NOTE — ED INITIAL ASSESSMENT (HPI)
Pt was here a few days ago for detoxm tried getting into a op detox and was told she couldn't get in today and than made SI statements, pt states shes been drinking all day

## 2019-07-12 NOTE — ED NOTES
Transfer out progress during Noc shift 7/11/19    [facility]- clinical faxed; states [facility] is unable to accept pt d/t eating d/o  [facility] with pt’s insurance  [address]- no answer  [address] with pt’s insurance  [facility]- states is unable to a

## 2019-07-12 NOTE — ED NOTES
Report given to [facility] RN [phone] at [facility] accepting MD Dr. Angi Tavarez

## 2019-07-12 NOTE — ED NOTES
[facility] HERE FOR TRANSPORT TO [facility]. BELONGINGS BAG [id number] SEAL INTACT GIVEN TO THEM.

## 2019-07-12 NOTE — ED NOTES
[address] with [employer]. [facility] Burt- no answer  [address] with pt’s insurance  [facility] is unable to accommodate pt’s eating d/o  [facility] they are busy and to call them back later  Tom-  no beds

## 2019-07-12 NOTE — ED NOTES
Spoke with patient, and she is frusterated because she was working with [facility] to get into rehab for ETOH and she was unable to due to she didn't sign a release form for her medical records to be faxed over to them.  Patient did sign a release of medical recor

## 2019-07-12 NOTE — BH LEVEL OF CARE REASSESSMENT
Level of Care Reassessment Note    The prior assessment completed on 7/10/19 has been reviewed. The level of care recommended was declined/postponed due to:      Pt was attempting to reach out to referrals provided today.     Patient presents today for tin and denied intent)  4. Have you had these thoughts and had some intention of acting on them? (past 30 days): No  5a. Have you started to work out or worked out the details of how to kill yourself? (past 30 days): No  5b.  Do you intend to carry out this myles aggressive behaviors or damaged property since initial assessment?: No  Intentionally injured yourself or had thoughts/urges to injure yourself since initial assessment?: Yes  Describe: a couple nights ago made superficial cuts on her arms and legs with a Assessment Summary  Re-assessment Summary: Pt is a 33 yr old female who arrived to the ER via EMS from her home d/t Si.  Pt states she was [facility] into [facility] [address] and was having issues with detox;Suicidal/homicidal risk  Behavioral Precautions: Suicide;Close Observation     Primary Psychiatric Diagnosis  Depressive Disorders: Major Depressive Disorder, Recurrent Episode  Depressive Disorder Recurrent Episode: Unspecified  Secondary Psychiatri you intend to carry out this plan? (past 30 days): No  6. Have you ever done anything, started to do anything, or prepared to do anything to end your life? (lifetime): Yes  7.  How long ago did you do any of these?: Between three months and a year ago  Scor No  Collateral for any access to means/firearms/weapons: no collateral      Self Injury  History of Self Injurious Behaviors: Yes  Date of Past Occurence: 07/09/19  Describe Past Self-Injurious Behaviors: scratching on forearm with fingernails or dull kitc Complete: EKG/Labs to be done independently     IBW Calculations  Weight: 130 lb  BMI (Calculated): 22.3  IBW LBS [place]: 120 LBS  IBW %: 108.33 %  IBW + 10%: 132 LBS  IBW - 10%: 108 LBS  SCOFF Questionnaire  Do you make yourself Sick because you feel uncom girls     Patient Weighing Habits and History  Highest Weight: 80  How old were you at your highest weight?: 19  Lowest Weight: 74 lbs   How old were you at your lowest weight?: 26  Are you currently weighing yourself?: Yes  When was the last time you Manjula Post these feelings typically after an episode?: Relieved     Purgeing Behavior  Have you ever purged?: Yes  Last purge date: 07/09/19  How often throughout the week do you have these episodes?: 14 or more times a week  How are you engaging in this: Self-induce every 2 days  How long with this pattern of use?: 8 days  Last Use?: 07/09/19 about 3 pm  Is your current use the most/worst it has ever been? : Yes     Illicit and Prescription Drug Use  Which if any illicit/prescription drugs have you used/abused?: Cristian Lanier Denies  Does anyone say or do something to you that makes you feel unsafe?: No  Have You Ever Been Harmed by a Partner/Caregiver?: No  Health Concerns r/t Abuse: No  Possible Abuse Reportable to[de-identified] Not appropriate for reporting to authorities     General Ap know I would never actually go through with it. \"       Motivational Stage of Change  Motivational Stage of Change: Contemplative     Level of Care Recommendations  Consulted with: Dr. Renaye Pallas - provide in network referrals.     Level of Care Recommendation:

## 2019-07-12 NOTE — ED NOTES
[facility] with pt’s insurance  [employer] [employer] [address]- unable to accommodate eating d/o  Mar[facility]- unable to accommodate d/t ETOH     Awaiting call from [employer]

## 2019-07-12 NOTE — ED PROVIDER NOTES
Patient Seen in: [facility] Emergency Department    History   Patient presents with:  Eval-P (psychiatric)    Stated Complaint: eval p    HPI    Patient is a 65-year-old female comes emergency room for psychiatric evaluation.   She was brought in by days    Drug use: Yes      Types: Cannabis      Review of Systems    Positive for stated complaint: eval p  Other systems are as noted in HPI. Constitutional and vital signs reviewed. All other systems reviewed and negative except as noted above. The following orders were created for panel order CBC WITH DIFFERENTIAL WITH PLATELET.   Procedure                               Abnormality         Status                     ---------                               -----------         ------

## 2019-08-22 NOTE — ED NOTES
Patient unable to get ride, patient once again reminded that she needs an adult to pick her up from the hospital that will take full responsibility of patient

## 2019-08-22 NOTE — ED NOTES
Patient ripped out her IV, per patient she has a ride coming for patient, patient made aware that ride needs to come to nurses station for patient to be discharged.

## 2019-08-22 NOTE — ED PROVIDER NOTES
Patient Seen in: [facility] Emergency Department    History   Patient presents with:  Alcohol Intoxication (neurologic)    Stated Complaint: ETOH intoxication found on well being check    HPI    79-year-old female presents for alcohol intoxication. Full range of motion throughout.         ED Course     Labs Reviewed   COMP METABOLIC PANEL (14) - Abnormal; Notable for the following components:       Result Value    BUN/CREA Ratio 9.5 (*)     All other components within normal limits   ETHYL ALCOHOL - A

## 2019-08-22 NOTE — ED NOTES
Pt belongings removed, inventoried, and sealed in smartsafe bag [id number]. Security called to lock smartsafe bag in locker.      Items in bag [id number] are as follows:    1 sweater   1 shirt  1 pair of pants  1 pair of underwear  1 pair of shoes  1 key on \"j

## 2019-08-22 NOTE — ED NOTES
Report rcvd by GRETA Henriquez. Patient alert and awake, phone was provided to patient to call for a ride.

## 2019-08-22 NOTE — ED NOTES
Patient ambulated in hallways with steady gait. Patient aware that she will be able to go home at [address]. JORGE LUIS agrees with plan.

## 2019-08-22 NOTE — ED INITIAL ASSESSMENT (HPI)
Pt here after wellness check. Pt seen by EMS and noted to be intoxicated. Pt just left rehab 3 days ago. Pt was in parents home and they are out of town. Pt has drank vodka insure of the amount.

## 2019-08-23 NOTE — ED NOTES
Patient's belongings returned, patient safe to go home per ERMD. Patient called for a ride via [address] [place] [address],[address].

## 2019-08-23 NOTE — ED NOTES
Patient with no other complaints this time. Patient denies any suicidal or homicidal ideation. patient is clinically sober and is ambulatory in the emergency room with steady gait and no ataxia. Patient tolerating oral fluids well.   As per discussion w

## 2019-09-17 PROBLEM — F10.239 ALCOHOL WITHDRAWAL SYNDROME WITH COMPLICATION (HCC): Status: ACTIVE | Noted: 2019-09-17

## 2019-09-17 PROBLEM — F10.920 ALCOHOLIC INTOXICATION WITHOUT COMPLICATION (HCC): Status: ACTIVE | Noted: 2019-09-17

## 2019-09-17 PROBLEM — R45.851 SUICIDAL IDEATION: Status: ACTIVE | Noted: 2019-09-17

## 2019-09-17 NOTE — ED PROVIDER NOTES
Patient Seen in: [facility] Emergency Department    History   Patient presents with:  Eval-P (psychiatric)  Alcohol Intoxication (neurologic)    Stated Complaint: ETOH, Eval-P, +SI     HPI    76-year-old in the emergency department with alcohol into joint abnormality    Calves are symmetric and nontender  Good peripheral color, cap refill . Skin: Unremarkable without lesions or rash. Neurologic: Awake alert and oriented x 3    Moving all 4 extremities without any trouble. No focal weakness. Rhythm  Reading: Rate, intervals and axes as noted on EKG Report. No acute ST Elevation or Depression,              Patient was seen upon arrival and kept in seclusion on a pulse oximeter and cardiac monitor because of her severe alcohol abuse history.   Debbie Irvin

## 2019-09-17 NOTE — ED INITIAL ASSESSMENT (HPI)
Last drink about 10 minutes ago. Texted friend stating \"I need your help, I am afraid of myself again and need rehab/hospital.\" Patient tearful, slurring words at this time. States drank about a pint of vodka today.

## 2019-09-17 NOTE — H&P
[facility]  History and Physical     Nella Young Patient Status:  Emergency    1990 MRN [id number]   Location [address] Attending Yanira Lacy MD   Hosp Day # 0 PCP None Pcp     Chief Complaint: ETOH intox mg by mouth nightly.  ) Disp: 60 tablet Rfl: 0   Calcium Carbonate-Vitamin D 250-125 MG-UNIT Oral Tab Take 2 tablets by mouth 2 (two) times daily. Disp:  Rfl: 0   Oyster Calcium 500 MG Oral Tab Take 1 tablet (500 mg total) by mouth daily.  Disp:  Rfl: 0   m Epic.      ASSESSMENT / PLAN:     1. ETOH intoxication and high risk for withdrawal   1. CIWA protocol   2. Replace lyt  3. IVF/MVI/FA  4. Psych eval   5. High risk for w/d  2. Depression with suicidal ideation  1. 1:1  2. Psych eval    3.  Thrombocytopenia

## 2019-09-17 NOTE — ED NOTES
Patient is resting comfortably. Pt denies anxiety denies agitation.  Dosing intermittently awakens easily

## 2019-09-17 NOTE — PROGRESS NOTES
PSYCH CONSULT    Date of Admission: 9/16/2019  Date of Consult: 9/17/19  Reason for Consultation: Suicide precautions    Impression:  Primary Psychiatric Diagnosis:  Alcohol intoxication with recent delirium.  She made a suicidal statement about wanting to

## 2019-09-17 NOTE — CONSULTS
[facility]  Report of Psychiatric Consultation    Irma Camp Patient Status:  Inpatient    1990 MRN [id number]   [facility] 3NE-A Attending Aleksandar Gomez MD   Hosp Day # 1 PCP None Pcp     Date of Admission: 19  Date of 9/16/19 after she sent a suicidal text to a friend Jose Scanlon saying she wanted to kill herself with a knife. She was very intoxicated with a BAL of 507. She was admitted on placed on both suicidal precautions and the CIWA protocol.      She began drinking beer Disorder Maternal Grandmother    • Alcohol and Other Disorders Associated Maternal Grandfather       reports that she has been smoking. She has been smoking about 0.50 packs per day.  She has never used smokeless tobacco. She reports that she drinks alcohol logical  Thought content: no delusions  Perceptions: no hallucinations  Associations: Intact    Orientation: Oriented person, place, time, situation  Attention and Concentration:   fair  Memory:  intact recent and intact remote  Language: Intact naming and

## 2019-09-17 NOTE — ED NOTES
Pt sitting up drinking water after eating lunch . NAD resps easy nonlabored.  Pt offers no complaints

## 2019-09-17 NOTE — ED NOTES
Patient attempting to take monitors and IV out yelling \"I just want my fucking stuff and to get out of here. \" MD and RN to bedside. Patient crying and repeating \"This is not fair, I just want to go home. I am taking this shit out and leaving. \" MD infor

## 2019-09-17 NOTE — PROGRESS NOTES
NURSING ADMISSION NOTE      Patient admitted via Cart  Oriented to room. Safety precautions initiated. Bed in low position. Call light in reach. Patient admitted for suicidal ideation and ETOH w/d.    Sitter at bedside for SI  CIWA protocol in place

## 2019-09-17 NOTE — ED NOTES
Pt resting comfortably on stretcher awakens easily stating she wants to go home. Pt denies anxiety at this time. Denies agitation.

## 2019-09-18 NOTE — PROGRESS NOTES
[facility]  Report of Psychiatric Progress Note    Nathan Quezada Patient Status:  Inpatient    1990 MRN [id number]   [facility] 3NE-A Attending Yuri Santoro MD   Hosp Day # 2 PCP None Pcp     Date of Admission: 19  Date of suicidal text to a friend Rosado Organ saying she wanted to kill herself with a knife. She was very intoxicated with a BAL of 507. She was admitted on placed on both suicidal precautions and the CIWA protocol. She began drinking beer in high school.  She was r (post-traumatic stress disorder)    • Seizure disorder ([facility] [address]     with detox from etoh      History reviewed. No pertinent surgical history.   Family History   Problem Relation Age of Onset   • Diabetes Maternal Grandmother    • Heart Disorder Maternal Dante Pulse: 69   Resp: 14   Temp:      Appearance: fair grooming  Behavior: normal psychomotor  Attitude: cooperative    Speech: fluent    Mood: Anxious and depressed  Affect: Congruent, tearful at times    Thought process: logical  Thought content: no delusi

## 2019-09-18 NOTE — PROGRESS NOTES
Pharmacy Note:  Route Optimization for Multivitamin, Thiamine, Folic Acid (Banana Bag)         Patient is currently on a banana bag (multivitamin, thiamine 187LB and folic acid 1mg) IVPB every 24 hours.   The patient meets the criteria to convert to the o

## 2019-09-18 NOTE — PLAN OF CARE
Patient is alert x4 with minor anxiety regarding future plan. No c/o pain or discomfort. Negative physical assessment. Patient is calm and appropriate with sitter at the bedside due to history of bulimia nervosa.   Problem: Patient/Family Goals  Goal: Pa

## 2019-09-18 NOTE — PROGRESS NOTES
[facility]  Progress Note     Anthony Chow Patient Status:  Inpatient    1990 MRN [id number]   [facility] 3NE-A Attending Alo Singer MD   Hosp Day # 1 PCP None Pcp     Chief Complaint: ETOH dependence    S: Patient feeli 9/19/2019] Thiamine HCl  100 mg Oral Daily   • multivitamin with minerals  1 tablet Oral Daily   • folic acid  1 mg Oral Daily   • busPIRone HCl  15 mg Oral TID   • enoxaparin  40 mg Subcutaneous Daily   • chlordiazePOXIDE HCl  25 mg Oral TID   • FLUoxetin

## 2019-09-18 NOTE — PLAN OF CARE
Resumed care of pt. At [address]. Pt. Is Ax4, drowsy, but cooperative, and pleasant.     Petitioned and Certified  1:1 sitter for safety due to Eating Disorder--Hx of Bulimia and Anorexia    Seizure precautions maintained    CIWA Q2--Scores ranged from 3 to a h

## 2019-09-19 NOTE — PROGRESS NOTES
Patient seen and examined    S- no complaints  General- NAd  Chest- CTAB  [facility]- RRR  Abdo- soft, NT, BS+    Plan dc home    Brandt Humphries M.D.   [facility]

## 2019-09-19 NOTE — PLAN OF CARE
Resumed care of pt. At [address]. Pt. Is Ax4, drowsy, but cooperative.   Petitioned and Certified  1:1 sitter for safety due to Eating Disorder--Hx of Bulimia and Anorexia     Seizure precautions maintained     CIWA Q2--Scores ranged from 3 to 8  Ativan given

## 2019-09-19 NOTE — BH PROGRESS NOTE
Received a call from the patients nurse stating she is being d/c and they are waiting for the psych referrals. These were placed in the d/c section.

## 2019-09-19 NOTE — PROGRESS NOTES
NURSING DISCHARGE NOTE    Discharged Home via Ambulatory. Accompanied by Friend  Belongings Returned to patient from safe. Patient's vitals taken, IV removed. Discharge paperwork reviewed in detail.   Information provided about Suicide hotline, and s

## 2019-09-19 NOTE — PROGRESS NOTES
[facility]  Report of Psychiatric Progress Note    Yolanda Dobbins Patient Status:  Inpatient    1990 MRN [id number]   [facility] 3NE-A Attending Moose Garcia MD   Hosp Day # 3 PCP None Pcp     Date of Admission: 19  Date of with severe major depression, PTSD, eating disorder NOS, and severe alcohol use disorder was brought to the ED on 9/16/19 after she sent a suicidal text to a friend Josephus Eisenmenger saying she wanted to kill herself with a knife.  She was very intoxicated with a BAL o children. She has a college degree in Art History. She has been unable to hold a job due to her substance use and mental health issues. She lives with her parents. Her parents and friend Becky Cat [phone] are her main support system.      Past Medical His cooperative    Speech: fluent    Mood: Anxious and depressed  Affect: Congruent    Thought process: logical  Thought content: no delusions  Perceptions: no hallucinations  Associations: Intact    Orientation: Oriented person, place, time, situation  Attent

## 2019-09-19 NOTE — PLAN OF CARE
Patient alert x4. CIWAs 2-3. Up with self. Sittler at bedside for eating disorder. Negative physical assessment. No c/o pain or discomfort. Plan to return home today.   Problem: Patient/Family Goals  Goal: Patient/Family Long Term Goal  Description  P

## 2019-09-21 NOTE — DISCHARGE SUMMARY
[facility]  DISCHARGE SUMMARY     [facility] Patient Status:  Inpatient    1990 MRN [id number]   [facility] 3NE-A Attending No att. providers found   Hosp Day # 2 PCP None Pcp     Date of Admission: 2019  Date of Disch Tabs  Commonly known as:  ReVia      Take 1 tablet (50 mg total) by mouth daily. Quantity:  30 tablet  Refills:  0     Prazosin HCl 2 MG Caps  Commonly known as:  MINIPRESS      Take 1 capsule (2 mg total) by mouth nightly.    Quantity:  30 capsule  Refil rhonchi. Cardiovascular: S1, S2. Regular rate and rhythm. No murmurs, rubs or gallops. Abdomen: Soft, nontender, nondistended. Positive bowel sounds. No rebound or guarding. Neurologic: No focal neurological deficits.    Musculoskeletal: Moves all extr

## 2023-08-30 NOTE — ED NOTES
[facility]- no beds  [facility]- closed-call back during business hours  [place] [facility]- no beds  [facility]- faxed clinical No indicators present

## 2024-03-09 NOTE — ED NOTES
Problem: Discharge Planning  Goal: Discharge to home or other facility with appropriate resources  Outcome: Progressing  Discharge plan is in process. Plan discharge home with family.     Problem: Pain  Goal: Verbalizes/displays adequate comfort level or baseline comfort level  Outcome: Progressing  Patient states pain relief from PRN pain medications. Pain reassessed one hour post PRN pain medication given.  Patient rates pain 7-8 on SARATH 0-10 scale.     Problem: ABCDS Injury Assessment  Goal: Absence of physical injury  Outcome: Progressing  Fall assessment completed. Patient using call light appropriately to call for assistance with ambulation to bathroom.  Personal items within reach. Patient is also compliant with use of non-skid slippers.      Problem: Skin/Tissue Integrity  Goal: Absence of new skin breakdown  Description: 1.  Monitor for areas of redness and/or skin breakdown  2.  Assess vascular access sites hourly  3.  Every 4-6 hours minimum:  Change oxygen saturation probe site  4.  Every 4-6 hours:  If on nasal continuous positive airway pressure, respiratory therapy assess nares and determine need for appliance change or resting period.  Outcome: Progressing  No skin breakdown this shift. Patient being assisted with turning. Patients states understanding of repositioning every two hours.     Problem: Gastrointestinal - Adult  Goal: Maintains or returns to baseline bowel function  Outcome: Progressing  Patient bowel sounds hypoactive.  Passing flatus.  Pt taking prescribed medication to assist with BM.    Care plan reviewed with patient and family.  Patient and family verbalize understanding of the plan of care and contribute to goal setting.        Nurse to nurse report given to Rachel at [facility].

## (undated) NOTE — ED AVS SNAPSHOT
Almetta Dakin   MRN: [id number]    Department:  [facility] Emergency Department   Date of Visit:  11/6/2018           Disclosure     Insurance plans vary and the physician(s) referred by the ER may not be covered by your plan.  Please contact your tell this physician (or your personal doctor if your instructions are to return to your personal doctor) about any new or lasting problems. The primary care or specialist physician will see patients referred from the [facility] Emergency Department.  Sara Person

## (undated) NOTE — ED AVS SNAPSHOT
Ania Lion   MRN: [id number]    Department:  [facility] Emergency Department   Date of Visit:  7/9/2019           Disclosure     Insurance plans vary and the physician(s) referred by the ER may not be covered by your plan.  Please contact your tell this physician (or your personal doctor if your instructions are to return to your personal doctor) about any new or lasting problems. The primary care or specialist physician will see patients referred from the [facility] Emergency Department.  Salvadore Skiff

## (undated) NOTE — ED AVS SNAPSHOT
Sheldon Deweyville   MRN: [id number]    Department:  [facility] Emergency Department   Date of Visit:  11/9/2018           Disclosure     Insurance plans vary and the physician(s) referred by the ER may not be covered by your plan.  Please contact your tell this physician (or your personal doctor if your instructions are to return to your personal doctor) about any new or lasting problems. The primary care or specialist physician will see patients referred from the [facility] Emergency Department.  Martinez Greenberg

## (undated) NOTE — ED AVS SNAPSHOT
Hurtadokarrie Miguel   MRN: [id number]    Department:  [facility] Emergency Department   Date of Visit:  8/22/2019           Disclosure     Insurance plans vary and the physician(s) referred by the ER may not be covered by your plan.  Please contact your tell this physician (or your personal doctor if your instructions are to return to your personal doctor) about any new or lasting problems. The primary care or specialist physician will see patients referred from the [facility] Emergency Department.  Shelton Lombard